# Patient Record
Sex: FEMALE | Race: OTHER | HISPANIC OR LATINO
[De-identification: names, ages, dates, MRNs, and addresses within clinical notes are randomized per-mention and may not be internally consistent; named-entity substitution may affect disease eponyms.]

---

## 2017-06-19 ENCOUNTER — APPOINTMENT (OUTPATIENT)
Dept: THORACIC SURGERY | Facility: CLINIC | Age: 52
End: 2017-06-19
Payer: COMMERCIAL

## 2017-06-19 VITALS
BODY MASS INDEX: 34.49 KG/M2 | HEART RATE: 76 BPM | DIASTOLIC BLOOD PRESSURE: 82 MMHG | RESPIRATION RATE: 14 BRPM | WEIGHT: 202 LBS | SYSTOLIC BLOOD PRESSURE: 120 MMHG | OXYGEN SATURATION: 99 % | HEIGHT: 64 IN

## 2017-06-19 DIAGNOSIS — Z82.49 FAMILY HISTORY OF ISCHEMIC HEART DISEASE AND OTHER DISEASES OF THE CIRCULATORY SYSTEM: ICD-10-CM

## 2017-06-19 DIAGNOSIS — Z82.3 FAMILY HISTORY OF STROKE: ICD-10-CM

## 2017-06-19 DIAGNOSIS — Z87.09 PERSONAL HISTORY OF OTHER DISEASES OF THE RESPIRATORY SYSTEM: ICD-10-CM

## 2017-06-19 DIAGNOSIS — Z86.018 PERSONAL HISTORY OF OTHER BENIGN NEOPLASM: ICD-10-CM

## 2017-06-19 DIAGNOSIS — Z77.128 CONTACT WITH AND (SUSPECTED) EXPOSURE TO OTHER HAZARDS IN THE PHYSICAL ENVIRONMENT: ICD-10-CM

## 2017-06-19 PROCEDURE — 99205 OFFICE O/P NEW HI 60 MIN: CPT

## 2017-06-19 RX ORDER — MONTELUKAST 10 MG/1
10 TABLET, FILM COATED ORAL
Qty: 30 | Refills: 0 | Status: COMPLETED | COMMUNITY
Start: 2016-09-01

## 2017-06-19 RX ORDER — PREDNISONE 20 MG/1
20 TABLET ORAL
Qty: 3 | Refills: 0 | Status: COMPLETED | COMMUNITY
Start: 2017-01-11

## 2017-06-19 RX ORDER — PREDNISONE 5 MG/1
5 TABLET ORAL
Qty: 42 | Refills: 0 | Status: COMPLETED | COMMUNITY
Start: 2017-03-17

## 2017-06-19 RX ORDER — AMOXICILLIN AND CLAVULANATE POTASSIUM 875; 125 MG/1; MG/1
875-125 TABLET, COATED ORAL
Qty: 14 | Refills: 0 | Status: COMPLETED | COMMUNITY
Start: 2017-05-31

## 2017-06-19 RX ORDER — FESOTERODINE FUMARATE 4 MG/1
4 TABLET, FILM COATED, EXTENDED RELEASE ORAL
Qty: 90 | Refills: 0 | Status: COMPLETED | COMMUNITY
Start: 2017-05-11

## 2017-06-19 RX ORDER — AZITHROMYCIN 250 MG/1
250 TABLET, FILM COATED ORAL
Qty: 6 | Refills: 0 | Status: COMPLETED | COMMUNITY
Start: 2017-01-06

## 2017-07-11 ENCOUNTER — RESULT CHARGE (OUTPATIENT)
Age: 52
End: 2017-07-11

## 2017-07-12 ENCOUNTER — APPOINTMENT (OUTPATIENT)
Dept: OTHER | Facility: CLINIC | Age: 52
End: 2017-07-12

## 2017-07-12 VITALS
BODY MASS INDEX: 34.83 KG/M2 | HEIGHT: 64 IN | SYSTOLIC BLOOD PRESSURE: 116 MMHG | OXYGEN SATURATION: 100 % | HEART RATE: 77 BPM | DIASTOLIC BLOOD PRESSURE: 78 MMHG | WEIGHT: 204 LBS

## 2017-07-12 RX ORDER — CHLORHEXIDINE GLUCONATE 4 %
LIQUID (ML) TOPICAL
Refills: 0 | Status: ACTIVE | COMMUNITY

## 2017-07-12 RX ORDER — MONTELUKAST 10 MG/1
10 TABLET, FILM COATED ORAL
Refills: 0 | Status: ACTIVE | COMMUNITY

## 2017-07-12 RX ORDER — LIDOCAINE 5% 700 MG/1
5 PATCH TOPICAL
Refills: 0 | Status: ACTIVE | COMMUNITY

## 2017-07-12 RX ORDER — ALBUTEROL 90 MCG
90 AEROSOL (GRAM) INHALATION
Refills: 0 | Status: ACTIVE | COMMUNITY

## 2017-07-13 LAB
ALBUMIN SERPL ELPH-MCNC: 4.1 G/DL
ALP BLD-CCNC: 129 U/L
ALT SERPL-CCNC: 41 U/L
ANION GAP SERPL CALC-SCNC: 12 MMOL/L
APPEARANCE: CLEAR
AST SERPL-CCNC: 27 U/L
BASOPHILS # BLD AUTO: 0.01 K/UL
BASOPHILS NFR BLD AUTO: 0.2 %
BILIRUB SERPL-MCNC: 0.3 MG/DL
BILIRUBIN URINE: NEGATIVE
BLOOD URINE: NEGATIVE
BUN SERPL-MCNC: 14 MG/DL
CALCIUM SERPL-MCNC: 9.8 MG/DL
CHLORIDE SERPL-SCNC: 107 MMOL/L
CHOLEST SERPL-MCNC: 240 MG/DL
CHOLEST/HDLC SERPL: 6.2 RATIO
CO2 SERPL-SCNC: 26 MMOL/L
COLOR: YELLOW
CREAT SERPL-MCNC: 0.83 MG/DL
EOSINOPHIL # BLD AUTO: 0.17 K/UL
EOSINOPHIL NFR BLD AUTO: 2.6 %
GLUCOSE QUALITATIVE U: NORMAL MG/DL
GLUCOSE SERPL-MCNC: 144 MG/DL
HCT VFR BLD CALC: 38.9 %
HDLC SERPL-MCNC: 39 MG/DL
HGB BLD-MCNC: 12.1 G/DL
IMM GRANULOCYTES NFR BLD AUTO: 0.5 %
KETONES URINE: NEGATIVE
LDLC SERPL CALC-MCNC: 135 MG/DL
LEUKOCYTE ESTERASE URINE: NEGATIVE
LYMPHOCYTES # BLD AUTO: 1.91 K/UL
LYMPHOCYTES NFR BLD AUTO: 29.2 %
MAN DIFF?: NORMAL
MCHC RBC-ENTMCNC: 27 PG
MCHC RBC-ENTMCNC: 31.1 GM/DL
MCV RBC AUTO: 86.8 FL
MONOCYTES # BLD AUTO: 0.28 K/UL
MONOCYTES NFR BLD AUTO: 4.3 %
NEUTROPHILS # BLD AUTO: 4.14 K/UL
NEUTROPHILS NFR BLD AUTO: 63.2 %
NITRITE URINE: NEGATIVE
PH URINE: 6
PLATELET # BLD AUTO: 287 K/UL
POTASSIUM SERPL-SCNC: 4.4 MMOL/L
PROT SERPL-MCNC: 7.1 G/DL
PROTEIN URINE: NEGATIVE MG/DL
RBC # BLD: 4.48 M/UL
RBC # FLD: 13.6 %
SODIUM SERPL-SCNC: 145 MMOL/L
SPECIFIC GRAVITY URINE: 1.02
TRIGL SERPL-MCNC: 328 MG/DL
UROBILINOGEN URINE: NORMAL MG/DL
WBC # FLD AUTO: 6.54 K/UL

## 2017-10-12 ENCOUNTER — OUTPATIENT (OUTPATIENT)
Dept: OUTPATIENT SERVICES | Facility: HOSPITAL | Age: 52
LOS: 1 days | End: 2017-10-12
Payer: COMMERCIAL

## 2017-10-12 ENCOUNTER — APPOINTMENT (OUTPATIENT)
Dept: SLEEP CENTER | Facility: CLINIC | Age: 52
End: 2017-10-12
Payer: COMMERCIAL

## 2017-10-12 PROCEDURE — 95810 POLYSOM 6/> YRS 4/> PARAM: CPT

## 2017-10-12 PROCEDURE — 95810 POLYSOM 6/> YRS 4/> PARAM: CPT | Mod: 26

## 2017-10-13 DIAGNOSIS — G47.33 OBSTRUCTIVE SLEEP APNEA (ADULT) (PEDIATRIC): ICD-10-CM

## 2017-10-26 ENCOUNTER — RESULT REVIEW (OUTPATIENT)
Age: 52
End: 2017-10-26

## 2018-03-12 ENCOUNTER — APPOINTMENT (OUTPATIENT)
Dept: OTHER | Facility: CLINIC | Age: 53
End: 2018-03-12
Payer: COMMERCIAL

## 2018-03-12 ENCOUNTER — APPOINTMENT (OUTPATIENT)
Dept: OTHER | Facility: CLINIC | Age: 53
End: 2018-03-12

## 2018-03-12 VITALS — RESPIRATION RATE: 18 BRPM | HEART RATE: 80 BPM | SYSTOLIC BLOOD PRESSURE: 120 MMHG | DIASTOLIC BLOOD PRESSURE: 68 MMHG

## 2018-03-12 PROCEDURE — 99214 OFFICE O/P EST MOD 30 MIN: CPT

## 2018-03-12 RX ORDER — ORLISTAT 120 MG/1
120 CAPSULE ORAL
Qty: 90 | Refills: 0 | Status: COMPLETED | COMMUNITY
Start: 2017-02-02 | End: 2018-03-12

## 2018-03-12 RX ORDER — KETOCONAZOLE 20.5 MG/ML
2 SHAMPOO, SUSPENSION TOPICAL
Qty: 120 | Refills: 0 | Status: COMPLETED | COMMUNITY
Start: 2016-12-16

## 2018-03-12 RX ORDER — OMEPRAZOLE 20 MG/1
20 CAPSULE, DELAYED RELEASE ORAL
Refills: 0 | Status: COMPLETED | COMMUNITY
End: 2018-03-12

## 2018-03-12 RX ORDER — METHYLPREDNISOLONE 4 MG/1
4 TABLET ORAL
Qty: 21 | Refills: 0 | Status: COMPLETED | COMMUNITY
Start: 2018-02-06

## 2018-03-12 RX ORDER — OSELTAMIVIR PHOSPHATE 75 MG/1
75 CAPSULE ORAL
Qty: 10 | Refills: 0 | Status: COMPLETED | COMMUNITY
Start: 2018-02-22

## 2018-03-12 RX ORDER — FLUTICASONE PROPIONATE 50 UG/1
50 SPRAY, METERED NASAL
Refills: 0 | Status: COMPLETED | COMMUNITY
End: 2018-03-12

## 2018-06-18 ENCOUNTER — APPOINTMENT (OUTPATIENT)
Dept: THORACIC SURGERY | Facility: CLINIC | Age: 53
End: 2018-06-18
Payer: COMMERCIAL

## 2018-06-18 VITALS
BODY MASS INDEX: 35.7 KG/M2 | HEART RATE: 84 BPM | OXYGEN SATURATION: 98 % | RESPIRATION RATE: 16 BRPM | DIASTOLIC BLOOD PRESSURE: 80 MMHG | WEIGHT: 208 LBS | SYSTOLIC BLOOD PRESSURE: 125 MMHG

## 2018-06-18 PROCEDURE — 99213 OFFICE O/P EST LOW 20 MIN: CPT

## 2018-06-19 ENCOUNTER — APPOINTMENT (OUTPATIENT)
Dept: THORACIC SURGERY | Facility: CLINIC | Age: 53
End: 2018-06-19
Payer: COMMERCIAL

## 2018-06-20 RX ORDER — FLUOCINOLONE ACETONIDE 0.1 MG/ML
0.01 SOLUTION TOPICAL
Qty: 60 | Refills: 0 | Status: COMPLETED | COMMUNITY
Start: 2018-05-11

## 2018-06-20 RX ORDER — CELECOXIB 200 MG/1
200 CAPSULE ORAL
Qty: 60 | Refills: 0 | Status: COMPLETED | COMMUNITY
Start: 2018-03-26

## 2018-06-20 RX ORDER — AMOXICILLIN 500 MG/1
500 CAPSULE ORAL
Qty: 20 | Refills: 0 | Status: COMPLETED | COMMUNITY
Start: 2018-04-02

## 2018-07-09 ENCOUNTER — APPOINTMENT (OUTPATIENT)
Dept: OTHER | Facility: CLINIC | Age: 53
End: 2018-07-09

## 2018-07-27 ENCOUNTER — APPOINTMENT (OUTPATIENT)
Dept: OTHER | Facility: CLINIC | Age: 53
End: 2018-07-27
Payer: COMMERCIAL

## 2018-07-27 VITALS
BODY MASS INDEX: 34.49 KG/M2 | OXYGEN SATURATION: 99 % | RESPIRATION RATE: 16 BRPM | SYSTOLIC BLOOD PRESSURE: 118 MMHG | WEIGHT: 202 LBS | HEIGHT: 64 IN | DIASTOLIC BLOOD PRESSURE: 81 MMHG | HEART RATE: 66 BPM

## 2018-07-27 PROCEDURE — 96150: CPT

## 2018-07-27 PROCEDURE — 94010 BREATHING CAPACITY TEST: CPT

## 2018-07-27 PROCEDURE — 99214 OFFICE O/P EST MOD 30 MIN: CPT | Mod: 25

## 2018-07-27 PROCEDURE — 99396 PREV VISIT EST AGE 40-64: CPT

## 2018-07-30 LAB
ALBUMIN SERPL ELPH-MCNC: 4.5 G/DL
ALP BLD-CCNC: 104 U/L
ALT SERPL-CCNC: 35 U/L
ANION GAP SERPL CALC-SCNC: 14 MMOL/L
APPEARANCE: CLEAR
AST SERPL-CCNC: 24 U/L
BASOPHILS # BLD AUTO: 0.01 K/UL
BASOPHILS NFR BLD AUTO: 0.2 %
BILIRUB SERPL-MCNC: 0.5 MG/DL
BILIRUBIN URINE: NEGATIVE
BLOOD URINE: NEGATIVE
BUN SERPL-MCNC: 11 MG/DL
CALCIUM SERPL-MCNC: 9.7 MG/DL
CHLORIDE SERPL-SCNC: 102 MMOL/L
CHOLEST SERPL-MCNC: 217 MG/DL
CHOLEST/HDLC SERPL: 5.3 RATIO
CO2 SERPL-SCNC: 26 MMOL/L
COLOR: YELLOW
CREAT SERPL-MCNC: 0.74 MG/DL
EOSINOPHIL # BLD AUTO: 0.29 K/UL
EOSINOPHIL NFR BLD AUTO: 4.9 %
GLUCOSE QUALITATIVE U: NEGATIVE MG/DL
GLUCOSE SERPL-MCNC: 99 MG/DL
HCT VFR BLD CALC: 41.6 %
HDLC SERPL-MCNC: 41 MG/DL
HGB BLD-MCNC: 13.2 G/DL
IMM GRANULOCYTES NFR BLD AUTO: 0.3 %
KETONES URINE: NEGATIVE
LDLC SERPL CALC-MCNC: 129 MG/DL
LEUKOCYTE ESTERASE URINE: NEGATIVE
LYMPHOCYTES # BLD AUTO: 1.6 K/UL
LYMPHOCYTES NFR BLD AUTO: 27.2 %
MAN DIFF?: NORMAL
MCHC RBC-ENTMCNC: 27.6 PG
MCHC RBC-ENTMCNC: 31.7 GM/DL
MCV RBC AUTO: 86.8 FL
MONOCYTES # BLD AUTO: 0.39 K/UL
MONOCYTES NFR BLD AUTO: 6.6 %
NEUTROPHILS # BLD AUTO: 3.58 K/UL
NEUTROPHILS NFR BLD AUTO: 60.8 %
NITRITE URINE: NEGATIVE
PH URINE: 6.5
PLATELET # BLD AUTO: 257 K/UL
POTASSIUM SERPL-SCNC: 4.4 MMOL/L
PROT SERPL-MCNC: 7.5 G/DL
PROTEIN URINE: NEGATIVE MG/DL
RBC # BLD: 4.79 M/UL
RBC # FLD: 13.9 %
SODIUM SERPL-SCNC: 142 MMOL/L
SPECIFIC GRAVITY URINE: 1.02
TRIGL SERPL-MCNC: 236 MG/DL
UROBILINOGEN URINE: NEGATIVE MG/DL
WBC # FLD AUTO: 5.89 K/UL

## 2018-11-16 ENCOUNTER — APPOINTMENT (OUTPATIENT)
Dept: HEPATOLOGY | Facility: CLINIC | Age: 53
End: 2018-11-16
Payer: COMMERCIAL

## 2018-11-16 VITALS
SYSTOLIC BLOOD PRESSURE: 127 MMHG | DIASTOLIC BLOOD PRESSURE: 80 MMHG | HEART RATE: 83 BPM | TEMPERATURE: 97.8 F | WEIGHT: 210 LBS | HEIGHT: 64 IN | BODY MASS INDEX: 35.85 KG/M2 | RESPIRATION RATE: 17 BRPM

## 2018-11-16 PROCEDURE — 99243 OFF/OP CNSLTJ NEW/EST LOW 30: CPT

## 2018-11-19 LAB
ALBUMIN SERPL ELPH-MCNC: 4.2 G/DL
ALP BLD-CCNC: 104 U/L
ALT SERPL-CCNC: 44 U/L
ANION GAP SERPL CALC-SCNC: 10 MMOL/L
AST SERPL-CCNC: 27 U/L
BASOPHILS # BLD AUTO: 0.01 K/UL
BASOPHILS NFR BLD AUTO: 0.1 %
BILIRUB SERPL-MCNC: 0.2 MG/DL
BUN SERPL-MCNC: 15 MG/DL
CALCIUM SERPL-MCNC: 9.7 MG/DL
CHLORIDE SERPL-SCNC: 106 MMOL/L
CO2 SERPL-SCNC: 26 MMOL/L
CREAT SERPL-MCNC: 0.96 MG/DL
EOSINOPHIL # BLD AUTO: 0.18 K/UL
EOSINOPHIL NFR BLD AUTO: 2.4 %
FERRITIN SERPL-MCNC: 49 NG/ML
GLUCOSE SERPL-MCNC: 85 MG/DL
HCT VFR BLD CALC: 36.5 %
HGB BLD-MCNC: 11.8 G/DL
IMM GRANULOCYTES NFR BLD AUTO: 0.4 %
LYMPHOCYTES # BLD AUTO: 2.47 K/UL
LYMPHOCYTES NFR BLD AUTO: 32.4 %
MAN DIFF?: NORMAL
MCHC RBC-ENTMCNC: 27.9 PG
MCHC RBC-ENTMCNC: 32.3 GM/DL
MCV RBC AUTO: 86.3 FL
MONOCYTES # BLD AUTO: 0.41 K/UL
MONOCYTES NFR BLD AUTO: 5.4 %
NEUTROPHILS # BLD AUTO: 4.53 K/UL
NEUTROPHILS NFR BLD AUTO: 59.3 %
PLATELET # BLD AUTO: 273 K/UL
POTASSIUM SERPL-SCNC: 4.4 MMOL/L
PROT SERPL-MCNC: 7.4 G/DL
RBC # BLD: 4.23 M/UL
RBC # FLD: 13.9 %
SODIUM SERPL-SCNC: 142 MMOL/L
WBC # FLD AUTO: 7.63 K/UL

## 2018-11-30 ENCOUNTER — APPOINTMENT (OUTPATIENT)
Dept: HEPATOLOGY | Facility: CLINIC | Age: 53
End: 2018-11-30
Payer: COMMERCIAL

## 2018-11-30 PROCEDURE — 91200 LIVER ELASTOGRAPHY: CPT

## 2018-12-11 ENCOUNTER — TRANSCRIPTION ENCOUNTER (OUTPATIENT)
Age: 53
End: 2018-12-11

## 2019-02-19 ENCOUNTER — APPOINTMENT (OUTPATIENT)
Dept: HEPATOLOGY | Facility: CLINIC | Age: 54
End: 2019-02-19

## 2019-02-22 ENCOUNTER — TRANSCRIPTION ENCOUNTER (OUTPATIENT)
Age: 54
End: 2019-02-22

## 2019-03-27 ENCOUNTER — TRANSCRIPTION ENCOUNTER (OUTPATIENT)
Age: 54
End: 2019-03-27

## 2019-03-29 ENCOUNTER — APPOINTMENT (OUTPATIENT)
Dept: HEPATOLOGY | Facility: CLINIC | Age: 54
End: 2019-03-29

## 2019-04-11 ENCOUNTER — APPOINTMENT (OUTPATIENT)
Dept: HEPATOLOGY | Facility: CLINIC | Age: 54
End: 2019-04-11
Payer: COMMERCIAL

## 2019-04-11 VITALS
RESPIRATION RATE: 17 BRPM | TEMPERATURE: 97.7 F | HEART RATE: 86 BPM | BODY MASS INDEX: 34.31 KG/M2 | HEIGHT: 64 IN | SYSTOLIC BLOOD PRESSURE: 136 MMHG | WEIGHT: 201 LBS | DIASTOLIC BLOOD PRESSURE: 89 MMHG

## 2019-04-11 PROCEDURE — 99214 OFFICE O/P EST MOD 30 MIN: CPT

## 2019-04-12 LAB
ALBUMIN SERPL ELPH-MCNC: 4.3 G/DL
ALP BLD-CCNC: 106 U/L
ALT SERPL-CCNC: 43 U/L
ANION GAP SERPL CALC-SCNC: 13 MMOL/L
AST SERPL-CCNC: 27 U/L
BASOPHILS # BLD AUTO: 0.02 K/UL
BASOPHILS NFR BLD AUTO: 0.3 %
BILIRUB SERPL-MCNC: 0.3 MG/DL
BUN SERPL-MCNC: 13 MG/DL
CALCIUM SERPL-MCNC: 9.9 MG/DL
CHLORIDE SERPL-SCNC: 105 MMOL/L
CO2 SERPL-SCNC: 25 MMOL/L
CREAT SERPL-MCNC: 0.88 MG/DL
EOSINOPHIL # BLD AUTO: 0.14 K/UL
EOSINOPHIL NFR BLD AUTO: 1.8 %
GLUCOSE SERPL-MCNC: 85 MG/DL
HCT VFR BLD CALC: 41.5 %
HGB BLD-MCNC: 12.7 G/DL
IMM GRANULOCYTES NFR BLD AUTO: 0.8 %
LYMPHOCYTES # BLD AUTO: 2.51 K/UL
LYMPHOCYTES NFR BLD AUTO: 32 %
MAN DIFF?: NORMAL
MCHC RBC-ENTMCNC: 26.9 PG
MCHC RBC-ENTMCNC: 30.6 GM/DL
MCV RBC AUTO: 87.9 FL
MONOCYTES # BLD AUTO: 0.46 K/UL
MONOCYTES NFR BLD AUTO: 5.9 %
NEUTROPHILS # BLD AUTO: 4.65 K/UL
NEUTROPHILS NFR BLD AUTO: 59.2 %
PLATELET # BLD AUTO: 277 K/UL
POTASSIUM SERPL-SCNC: 4 MMOL/L
PROT SERPL-MCNC: 7 G/DL
RBC # BLD: 4.72 M/UL
RBC # FLD: 13.7 %
SODIUM SERPL-SCNC: 143 MMOL/L
WBC # FLD AUTO: 7.84 K/UL

## 2019-04-21 ENCOUNTER — FORM ENCOUNTER (OUTPATIENT)
Age: 54
End: 2019-04-21

## 2019-04-22 ENCOUNTER — APPOINTMENT (OUTPATIENT)
Dept: ULTRASOUND IMAGING | Facility: IMAGING CENTER | Age: 54
End: 2019-04-22
Payer: COMMERCIAL

## 2019-04-22 ENCOUNTER — OUTPATIENT (OUTPATIENT)
Dept: OUTPATIENT SERVICES | Facility: HOSPITAL | Age: 54
LOS: 1 days | End: 2019-04-22
Payer: COMMERCIAL

## 2019-04-22 DIAGNOSIS — K76.0 FATTY (CHANGE OF) LIVER, NOT ELSEWHERE CLASSIFIED: ICD-10-CM

## 2019-04-22 PROCEDURE — 76700 US EXAM ABDOM COMPLETE: CPT

## 2019-04-22 PROCEDURE — 76700 US EXAM ABDOM COMPLETE: CPT | Mod: 26

## 2019-04-25 ENCOUNTER — TRANSCRIPTION ENCOUNTER (OUTPATIENT)
Age: 54
End: 2019-04-25

## 2019-05-30 PROBLEM — R91.1 LUNG NODULE: Status: ACTIVE | Noted: 2017-06-16

## 2019-05-31 ENCOUNTER — FORM ENCOUNTER (OUTPATIENT)
Age: 54
End: 2019-05-31

## 2019-06-01 ENCOUNTER — OUTPATIENT (OUTPATIENT)
Dept: OUTPATIENT SERVICES | Facility: HOSPITAL | Age: 54
LOS: 1 days | End: 2019-06-01
Payer: COMMERCIAL

## 2019-06-01 ENCOUNTER — APPOINTMENT (OUTPATIENT)
Dept: CT IMAGING | Facility: IMAGING CENTER | Age: 54
End: 2019-06-01
Payer: COMMERCIAL

## 2019-06-01 DIAGNOSIS — Z00.8 ENCOUNTER FOR OTHER GENERAL EXAMINATION: ICD-10-CM

## 2019-06-01 PROCEDURE — 71250 CT THORAX DX C-: CPT

## 2019-06-01 PROCEDURE — 71250 CT THORAX DX C-: CPT | Mod: 26

## 2019-06-04 ENCOUNTER — APPOINTMENT (OUTPATIENT)
Dept: THORACIC SURGERY | Facility: CLINIC | Age: 54
End: 2019-06-04
Payer: COMMERCIAL

## 2019-06-04 VITALS
SYSTOLIC BLOOD PRESSURE: 120 MMHG | RESPIRATION RATE: 16 BRPM | OXYGEN SATURATION: 97 % | HEART RATE: 88 BPM | BODY MASS INDEX: 35.36 KG/M2 | WEIGHT: 206 LBS | DIASTOLIC BLOOD PRESSURE: 79 MMHG

## 2019-06-04 DIAGNOSIS — R91.1 SOLITARY PULMONARY NODULE: ICD-10-CM

## 2019-06-04 PROCEDURE — 99213 OFFICE O/P EST LOW 20 MIN: CPT

## 2019-06-06 NOTE — PHYSICAL EXAM
[Auscultation Breath Sounds / Voice Sounds] : lungs were clear to auscultation bilaterally [Heart Rate And Rhythm] : heart rate was normal and rhythm regular [Heart Sounds] : normal S1 and S2 [Heart Sounds Gallop] : no gallops [Murmurs] : no murmurs [Chest Visual Inspection Thoracic Asymmetry] : no chest asymmetry [Heart Sounds Pericardial Friction Rub] : no pericardial rub [Examination Of The Chest] : the chest was normal in appearance [Diminished Respiratory Excursion] : normal chest expansion [Abdomen Soft] : soft [Bowel Sounds] : normal bowel sounds [Abdomen Mass (___ Cm)] : no abdominal mass palpated [Abdomen Tenderness] : non-tender [Nail Clubbing] : no clubbing  or cyanosis of the fingernails [Abnormal Walk] : normal gait [Musculoskeletal - Swelling] : no joint swelling seen [Skin Color & Pigmentation] : normal skin color and pigmentation [Skin Turgor] : normal skin turgor [Motor Tone] : muscle strength and tone were normal [Sensation] : the sensory exam was normal to light touch and pinprick [] : no rash [Deep Tendon Reflexes (DTR)] : deep tendon reflexes were 2+ and symmetric [Oriented To Time, Place, And Person] : oriented to person, place, and time [No Focal Deficits] : no focal deficits [Impaired Insight] : insight and judgment were intact [Affect] : the affect was normal

## 2019-06-06 NOTE — CONSULT LETTER
[Dear  ___] : Dear  [unfilled], [Consult Letter:] : I had the pleasure of evaluating your patient, [unfilled]. [( Thank you for referring [unfilled] for consultation for _____ )] : Thank you for referring [unfilled] for consultation for [unfilled] [Please see my note below.] : Please see my note below. [Consult Closing:] : Thank you very much for allowing me to participate in the care of this patient.  If you have any questions, please do not hesitate to contact me. [Sincerely,] : Sincerely, [DrSaeed  ___] : Dr. DIAZ [DrSaeed ___] : Dr. DIAZ [FreeTextEntry2] : Madie Combs MD (Hem/Onc)\kain Gaitan MD (PCP)\kain Long MD (Pul) [FreeTextEntry3] : Dane Lin MD, MPH \par System Director of Thoracic Surgery \par Director of Comprehensive Lung and Foregut [place] \par Professor Cardiovascular & Thoracic Surgery  \par [facility] [employer] [facility]\par

## 2019-06-06 NOTE — HISTORY OF PRESENT ILLNESS
[FreeTextEntry1] : 54 y/o F, never smoker, w/ hx of DM, HLD, lobular carcinoma in situ s/p Lt lumpectomy on 6/2013 (s/p tamoxifen x 6 mo, stop due to elevated LFT), who presents w/ increasing size lung nodules.\par \par CT Chest on 3/20/18:\par - few stable nonspecific subcentimeter nodules: 3mm TATO, another 3mm TATO, a 4mm RUL, a 5mm RML, and a 4mm RLL\par \par CT Chest on 6/1/19:\par - few 3mm josefina-fissural nodules, likely benign LNs, stable from 4/2017\par - JIAN\par \par Pt is here today for follow up. Admits to productive cough with large amount of yellowish sputum, congestion and chest pain while coughing.

## 2019-06-20 NOTE — DATA REVIEWED
[FreeTextEntry1] : CT Chest on 6/1/19:\par - few 3mm josefina-fissural nodules, likely benign LNs, stable from 4/2017\par - JIAN declined

## 2019-06-24 ENCOUNTER — EMERGENCY (EMERGENCY)
Facility: HOSPITAL | Age: 54
LOS: 1 days | Discharge: HOME | End: 2019-06-24
Attending: EMERGENCY MEDICINE
Payer: SELF-PAY

## 2019-06-24 VITALS
TEMPERATURE: 98 F | WEIGHT: 169.98 LBS | SYSTOLIC BLOOD PRESSURE: 120 MMHG | OXYGEN SATURATION: 97 % | HEIGHT: 62 IN | DIASTOLIC BLOOD PRESSURE: 81 MMHG | HEART RATE: 83 BPM | RESPIRATION RATE: 16 BRPM

## 2019-06-24 DIAGNOSIS — Z90.710 ACQUIRED ABSENCE OF BOTH CERVIX AND UTERUS: Chronic | ICD-10-CM

## 2019-06-24 PROCEDURE — 72192 CT PELVIS W/O DYE: CPT

## 2019-06-24 PROCEDURE — 70450 CT HEAD/BRAIN W/O DYE: CPT | Mod: 26

## 2019-06-24 PROCEDURE — 96372 THER/PROPH/DIAG INJ SC/IM: CPT

## 2019-06-24 PROCEDURE — 70450 CT HEAD/BRAIN W/O DYE: CPT

## 2019-06-24 PROCEDURE — 72125 CT NECK SPINE W/O DYE: CPT

## 2019-06-24 PROCEDURE — 72192 CT PELVIS W/O DYE: CPT | Mod: 26

## 2019-06-24 PROCEDURE — 72128 CT CHEST SPINE W/O DYE: CPT

## 2019-06-24 PROCEDURE — 72131 CT LUMBAR SPINE W/O DYE: CPT | Mod: 26

## 2019-06-24 PROCEDURE — 72131 CT LUMBAR SPINE W/O DYE: CPT

## 2019-06-24 PROCEDURE — 72128 CT CHEST SPINE W/O DYE: CPT | Mod: 26

## 2019-06-24 PROCEDURE — 72125 CT NECK SPINE W/O DYE: CPT | Mod: 26

## 2019-06-24 PROCEDURE — 99284 EMERGENCY DEPT VISIT MOD MDM: CPT | Mod: 25

## 2019-06-24 PROCEDURE — 99283 EMERGENCY DEPT VISIT LOW MDM: CPT

## 2019-06-24 RX ORDER — CYCLOBENZAPRINE HYDROCHLORIDE 10 MG/1
10 TABLET, FILM COATED ORAL ONCE
Refills: 0 | Status: COMPLETED | OUTPATIENT
Start: 2019-06-24 | End: 2019-06-24

## 2019-06-24 RX ORDER — IBUPROFEN 200 MG
1 TABLET ORAL
Qty: 30 | Refills: 0
Start: 2019-06-24

## 2019-06-24 RX ORDER — CYCLOBENZAPRINE HYDROCHLORIDE 10 MG/1
1 TABLET, FILM COATED ORAL
Qty: 15 | Refills: 0
Start: 2019-06-24

## 2019-06-24 RX ORDER — KETOROLAC TROMETHAMINE 30 MG/ML
30 SYRINGE (ML) INJECTION ONCE
Refills: 0 | Status: DISCONTINUED | OUTPATIENT
Start: 2019-06-24 | End: 2019-06-24

## 2019-06-24 RX ADMIN — Medication 30 MILLIGRAM(S): at 21:03

## 2019-06-24 RX ADMIN — Medication 30 MILLIGRAM(S): at 20:19

## 2019-06-24 RX ADMIN — CYCLOBENZAPRINE HYDROCHLORIDE 10 MILLIGRAM(S): 10 TABLET, FILM COATED ORAL at 20:19

## 2019-06-24 NOTE — ED PROVIDER NOTE - PROGRESS NOTE DETAILS
imaging negative. CTs unremarkable. Will dc with antiinflammatories and muscle relaxers. Patient in agreement with plan.
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
## 2019-06-24 NOTE — ED PROVIDER NOTE - MUSCULOSKELETAL, MLM
Spine appears normal, range of motion is not limited, midline cervical thoracic and lumbar tenderness, no hip tenderness,

## 2019-06-24 NOTE — ED PROVIDER NOTE - PMH
Anemia    Anxiety    Asthma    Borderline diabetic    Gastroenteritis    GERD (gastroesophageal reflux disease)    History of seasonal allergies    HLD (hyperlipidemia)    Human papillomavirus (HPV)    Lumbar spine pain  due to herniated disc  Uterine leiomyoma

## 2019-06-24 NOTE — ED PROVIDER NOTE - CONSTITUTIONAL, MLM
normal... Well appearing, nervous, well nourished, awake, alert, oriented to person, place, time/situation and in mild apparent distress.

## 2019-06-24 NOTE — ED PROVIDER NOTE - PSH
Breast cyst  Left breast cyst aspiration 10/2010  H/O hysterectomy for benign disease    History of hammer toe correction  Bilateral in 7/2011  History of lipoma removed from back  8/2011  History of prior ablation treatment  Uterine Ablation due to fibroids  S/P bunionectomy  Bilateral in 7/2011

## 2019-07-24 PROBLEM — J45.909 UNSPECIFIED ASTHMA, UNCOMPLICATED: Chronic | Status: ACTIVE | Noted: 2019-06-24

## 2019-07-24 PROBLEM — R73.03 PREDIABETES: Chronic | Status: ACTIVE | Noted: 2019-06-24

## 2019-07-24 PROBLEM — F41.9 ANXIETY DISORDER, UNSPECIFIED: Chronic | Status: ACTIVE | Noted: 2019-06-24

## 2019-07-24 PROBLEM — E78.5 HYPERLIPIDEMIA, UNSPECIFIED: Chronic | Status: ACTIVE | Noted: 2019-06-24

## 2019-07-30 ENCOUNTER — APPOINTMENT (OUTPATIENT)
Dept: OTHER | Facility: CLINIC | Age: 54
End: 2019-07-30
Payer: COMMERCIAL

## 2019-07-30 VITALS
SYSTOLIC BLOOD PRESSURE: 121 MMHG | HEIGHT: 64 IN | HEART RATE: 91 BPM | OXYGEN SATURATION: 98 % | RESPIRATION RATE: 16 BRPM | DIASTOLIC BLOOD PRESSURE: 72 MMHG | BODY MASS INDEX: 35.85 KG/M2 | WEIGHT: 210 LBS

## 2019-07-30 PROCEDURE — 99396 PREV VISIT EST AGE 40-64: CPT | Mod: 25

## 2019-07-30 PROCEDURE — 94010 BREATHING CAPACITY TEST: CPT

## 2019-07-30 PROCEDURE — 99214 OFFICE O/P EST MOD 30 MIN: CPT | Mod: 25

## 2019-07-30 RX ORDER — MOMETASONE FUROATE MONOHYDRATE 50 UG/1
SPRAY, METERED NASAL
Refills: 0 | Status: COMPLETED | COMMUNITY
End: 2019-07-30

## 2019-07-30 RX ORDER — METHOCARBAMOL 500 MG/1
500 TABLET, FILM COATED ORAL
Qty: 6 | Refills: 0 | Status: COMPLETED | COMMUNITY
Start: 2018-07-26 | End: 2019-07-30

## 2019-07-30 RX ORDER — CYANOCOBALAMIN (VITAMIN B-12) 1000MCG/ML
VIAL (ML) INJECTION
Refills: 0 | Status: ACTIVE | COMMUNITY

## 2019-07-31 LAB
ALBUMIN SERPL ELPH-MCNC: 3.8 G/DL
ALP BLD-CCNC: 113 U/L
ALT SERPL-CCNC: 37 U/L
ANION GAP SERPL CALC-SCNC: 12 MMOL/L
APPEARANCE: CLEAR
AST SERPL-CCNC: 25 U/L
BACTERIA: NEGATIVE
BASOPHILS # BLD AUTO: 0.03 K/UL
BASOPHILS NFR BLD AUTO: 0.4 %
BILIRUB SERPL-MCNC: 0.4 MG/DL
BILIRUBIN URINE: NEGATIVE
BLOOD URINE: NEGATIVE
BUN SERPL-MCNC: 11 MG/DL
CALCIUM SERPL-MCNC: 9.2 MG/DL
CHLORIDE SERPL-SCNC: 102 MMOL/L
CHOLEST SERPL-MCNC: 193 MG/DL
CHOLEST/HDLC SERPL: 5.1 RATIO
CO2 SERPL-SCNC: 25 MMOL/L
COLOR: YELLOW
CREAT SERPL-MCNC: 0.93 MG/DL
EOSINOPHIL # BLD AUTO: 0.19 K/UL
EOSINOPHIL NFR BLD AUTO: 2.5 %
GLUCOSE QUALITATIVE U: NEGATIVE
GLUCOSE SERPL-MCNC: 93 MG/DL
HCT VFR BLD CALC: 39.3 %
HDLC SERPL-MCNC: 38 MG/DL
HGB BLD-MCNC: 11.7 G/DL
HYALINE CASTS: 1 /LPF
IMM GRANULOCYTES NFR BLD AUTO: 1.5 %
KETONES URINE: NEGATIVE
LDLC SERPL CALC-MCNC: 107 MG/DL
LEUKOCYTE ESTERASE URINE: NEGATIVE
LYMPHOCYTES # BLD AUTO: 1.61 K/UL
LYMPHOCYTES NFR BLD AUTO: 21.6 %
MAN DIFF?: NORMAL
MCHC RBC-ENTMCNC: 27 PG
MCHC RBC-ENTMCNC: 29.8 GM/DL
MCV RBC AUTO: 90.8 FL
MICROSCOPIC-UA: NORMAL
MONOCYTES # BLD AUTO: 0.5 K/UL
MONOCYTES NFR BLD AUTO: 6.7 %
NEUTROPHILS # BLD AUTO: 5.02 K/UL
NEUTROPHILS NFR BLD AUTO: 67.3 %
NITRITE URINE: NEGATIVE
PH URINE: 6.5
PLATELET # BLD AUTO: 286 K/UL
POTASSIUM SERPL-SCNC: 4.5 MMOL/L
PROT SERPL-MCNC: 6.2 G/DL
PROTEIN URINE: NORMAL
RBC # BLD: 4.33 M/UL
RBC # FLD: 14 %
RED BLOOD CELLS URINE: 4 /HPF
SODIUM SERPL-SCNC: 139 MMOL/L
SPECIFIC GRAVITY URINE: 1.02
SQUAMOUS EPITHELIAL CELLS: 4 /HPF
TRIGL SERPL-MCNC: 240 MG/DL
UROBILINOGEN URINE: NORMAL
WBC # FLD AUTO: 7.46 K/UL
WHITE BLOOD CELLS URINE: 0 /HPF

## 2019-08-03 ENCOUNTER — EMERGENCY (EMERGENCY)
Facility: HOSPITAL | Age: 54
LOS: 1 days | Discharge: HOME | End: 2019-08-03
Attending: EMERGENCY MEDICINE
Payer: COMMERCIAL

## 2019-08-03 VITALS
TEMPERATURE: 98 F | DIASTOLIC BLOOD PRESSURE: 80 MMHG | HEART RATE: 76 BPM | RESPIRATION RATE: 17 BRPM | HEIGHT: 64 IN | OXYGEN SATURATION: 98 % | WEIGHT: 208.34 LBS | SYSTOLIC BLOOD PRESSURE: 127 MMHG

## 2019-08-03 DIAGNOSIS — Z90.710 ACQUIRED ABSENCE OF BOTH CERVIX AND UTERUS: Chronic | ICD-10-CM

## 2019-08-03 PROCEDURE — 71046 X-RAY EXAM CHEST 2 VIEWS: CPT | Mod: 26

## 2019-08-03 PROCEDURE — 99283 EMERGENCY DEPT VISIT LOW MDM: CPT | Mod: 25

## 2019-08-03 PROCEDURE — 71046 X-RAY EXAM CHEST 2 VIEWS: CPT

## 2019-08-03 PROCEDURE — 99283 EMERGENCY DEPT VISIT LOW MDM: CPT

## 2019-08-03 RX ORDER — CLARITHROMYCIN 500 MG
1 TABLET ORAL
Qty: 6 | Refills: 0
Start: 2019-08-03 | End: 2019-08-07

## 2019-08-03 NOTE — ED PROVIDER NOTE - OBJECTIVE STATEMENT
54 y/o F with an extensive PMHx/PSHx which includes Hyperlipidemia, Asthma  presents to ED c/o cough, chest congestion x 2 weeks. Denies chest pain, nausea, vomiting. Pt endorses sometimes experiencing coughing fits with associated SOB. Also endorses pain to left ear x yesterday. NKDA.

## 2019-08-03 NOTE — ED ADULT NURSE NOTE - NSSUSCREENINGQ1_ED_ALL_ED
Adult Progress Note      Chief Complaint   Patient presents with   • Follow-up     cholesterol, thyroid labs        HPI  The patient is here for 3-month recheck.  She generally prefers avoiding the doctor's office but her son asked her to be evaluated in February.  She was not taking her thyroid medicine correctly because she was forgetting that much of the time.  I increased it to 50 alternating with 75 after a TSH of 32.  She takes all of her pills together at night and that makes it a lot easier for her and she is more compliant.  Generally she seems in good spirits.  She and Hector seem to be doing well together.  They have a lot of help in the home.  They recently concluded some home health interventions for gene and and out of the house during that time.  They have a lady that helps him with her ADLs with a speak very highly of.  Hector's health is better and so that improves now that of course.  Today's LDL 41 and TSH 2.87 so right where it should be  Patient Active Problem List    Diagnosis Date Noted   • Essential hypertension 10/07/2016     Priority: Low   • History of pneumonia 06/12/2015     Priority: Low     2015     • Caregiver burden 10/10/2014     Priority: Low      Gene w CVA damage     • Anxiety associated with depression 04/29/2013     Priority: Low     Poor response anti depressants-- does ok w benzos     • Chronic ischemic heart disease 12/03/2011     Priority: Low     4 vessel CABG 2005 - neg Lexiscan 2011     • Hyperlipidemia 12/03/2011     Priority: Low   • Hypothyroidism 12/03/2011     Priority: Low     Severe when noncompliant with meds         Past Surgical History:   Procedure Laterality Date   • Coronary artery bypass graft  05/01/2005    4 vessel     ALLERGIES:  Mirtazapine   reports that she has never smoked. She has never used smokeless tobacco. She reports that she does not drink alcohol.  Vitals:    05/28/19 1522   BP: 160/56   Pulse: 65   Temp: 98.6 °F (37 °C)   SpO2: 96%    Weight: 49 kg (108 lb)   Height: 5' 1\" (1.549 m)     No visits with results within 1 Month(s) from this visit.   Latest known visit with results is:   Orders Only on 02/18/2019   Component Date Value   • THYROID STIMULATING HORM* 02/18/2019 32.000*     Current Outpatient Medications   Medication Sig Dispense Refill   • levothyroxine (SYNTHROID, LEVOTHROID) 75 MCG tablet Take 1 tablet by mouth 2 days a week. for thyroid   50 mcg the other 5 days 30 tablet 3   • metoPROLOL succinate (TOPROL-XL) 25 MG 24 hr tablet Take 0.5 tablets by mouth daily. FOR BLOOD PRESSURE AND HEART 45 tablet 3   • rosuvastatin (CRESTOR) 20 MG tablet Take 1 tablet by mouth nightly. In evening FOR CHOLESTEROL 90 tablet 3   • losartan (COZAAR) 25 MG tablet Take 1 tablet by mouth daily. for heart and bp 90 tablet 3   • ALPRAZolam (XANAX) 0.5 MG tablet TAKE 1 OR 1.5 TABLETS BY MOUTH AT BEDTIME AS NEEDED FOR RELAXANT 45 tablet 5     No current facility-administered medications for this visit.        Review of Systems   Constitutional: Negative for weight loss.   Respiratory: Negative for shortness of breath.         Not much work -- DEJESUS w 2 hours work   Cardiovascular: Negative for chest pain.   Psychiatric/Behavioral: Negative for depression. The patient is not nervous/anxious (likes taking care of Gene).        Physical Exam   Constitutional: She is well-developed, well-nourished, and in no distress.   Vivacious for age---  Just gave speech at [employer] re depression   Neck: No JVD present.   Cardiovascular: Normal rate and regular rhythm.   2/6 CAESAR,, PMI MCL   Pulmonary/Chest: She has no rales.   Abdominal:   Pseudomass right upper quadrant I think probably related to calcified aorta   Lymphadenopathy:     She has no cervical adenopathy.   Skin: Rash noted.   Psychiatric: Mood and affect normal.   Nursing note and vitals reviewed.          Assessment :  Problem List Items Addressed This Visit     Chronic ischemic heart disease - Primary     Anxiety associated with depression    Hyperlipidemia    Essential hypertension    Hypothyroidism            Plan:  Patient Instructions   1.  The patient is doing very well  2.  The thyroid levels and the cholesterol levels were excellent no change in medication suggested  3.  See me for an office call when Hector is next year in early August-Charla does not need blood test prior to that visit          Time spent with patient:   25 minGreater than 50 percent of the visit was spent in counseling and coordination of care.   Teddy Manning MD                             No

## 2019-10-18 ENCOUNTER — APPOINTMENT (OUTPATIENT)
Dept: HEPATOLOGY | Facility: CLINIC | Age: 54
End: 2019-10-18
Payer: COMMERCIAL

## 2019-10-18 VITALS
WEIGHT: 200 LBS | RESPIRATION RATE: 16 BRPM | DIASTOLIC BLOOD PRESSURE: 77 MMHG | SYSTOLIC BLOOD PRESSURE: 119 MMHG | HEART RATE: 86 BPM | BODY MASS INDEX: 34.15 KG/M2 | TEMPERATURE: 98.5 F | HEIGHT: 64 IN

## 2019-10-18 PROCEDURE — 99213 OFFICE O/P EST LOW 20 MIN: CPT

## 2019-10-18 RX ORDER — OXYCODONE 5 MG/1
5 TABLET ORAL
Qty: 15 | Refills: 0 | Status: DISCONTINUED | COMMUNITY
Start: 2018-10-16 | End: 2019-10-18

## 2019-10-18 NOTE — CONSULT LETTER
[Courtesy Letter:] : I had the pleasure of seeing your patient, [unfilled], in my office today. [Dear  ___] : Dear  [unfilled], [( Thank you for referring [unfilled] for consultation for _____ )] : Thank you for referring [unfilled] for consultation for [unfilled] [Sincerely,] : Sincerely, [Please see my note below.] : Please see my note below. [Consult Closing:] : Thank you very much for allowing me to participate in the care of this patient.  If you have any questions, please do not hesitate to contact me. [FreeTextEntry2] : Alla Gaitan MD [FreeTextEntry3] : Candice Gallardo\par Nurse Practitioner \par Hepatology\par [facility] \par [address]\[place] [address]\par Tel: [phone]\par \par \par

## 2019-10-18 NOTE — ASSESSMENT
[FreeTextEntry1] : Assessment and Plan:  is a 53 year-old female with history of IBS, GERD, SHILPA, hyperparathyroidism, multiple lung nodules, carpal tunnel syndrome, obstructive sleep apnea, breast cancer s/p lumpectomy, cervical dysplasia s/p total hysterectomy in 2012,  borderline DM and dyslipidemia, and non-alcoholic fatty liver disease presents to the hepatology clinic for follow up.\par \par 1.NAFLD \par She was diagnosed with fatty liver on abdominal US in 12/2013 and developed abnormal liver enzymes since 2014. She is s/p lumpectomy for CIS in 2013 s/p Tamoxifen from 8/2014 to 1/2015, and again 6/2015 to 9/2015 due to side effects and elevated liver enzymes respectively. A liver biopsy from 12/2015 reported moderate to severe steatosis, increased portal fibrosis, mild chronic inflammation in portal area. Her Fibroscan from 11/30/17 with a median liver stiffness of 5.8 kPa which is consistent with F0-F1 disease,  dB/m (S 3). Patient has NAFLD s/p liver biopsy and S3 steatosis on Fibroscan. She most likely has MetS associated NAFLD. Encouraged by patient’s recent weight loss. Advised patient to continue to diet and exercise and lose about 5-10% of her weight. We will repeat labs today. \par \par We discussed the natural history of non-alcoholic fatty liver disease, disease progression to JACQUES and cirrhosis and the risk of hepatocellular carcinoma. \par \par 2. Health Maintenance \par Immunizations: Immune to HAV, Immune to HBV\par Colonoscopy: 2016 reportedly was normal\par \par Follow Up: 6 months \par \par Candice Gallardo\par Nurse Practitioner \par Hepatology\par [facility] \par [address]\[place] [address]\par Tel: [phone]\par \par

## 2019-10-18 NOTE — PHYSICAL EXAM
[General Appearance - Alert] : alert [] : no respiratory distress [Heart Rate And Rhythm] : heart rate was normal and rhythm regular [Abnormal Walk] : normal gait [Bowel Sounds] : normal bowel sounds [Skin Color & Pigmentation] : normal skin color and pigmentation [Oriented To Time, Place, And Person] : oriented to person, place, and time [Spider Angioma] : No spider angioma(s) were observed [Scleral Icterus] : No Scleral Icterus [Ascites Fluid Wave] : no ascites fluid wave [Asterixis] : no asterixis observed [Abdominal  Ascites] : no ascites [Jaundice] : No jaundice [Depression] : no depression [Hallucinations] : ~T no ~M hallucinations [Delusions] : no ~T delusions [Suicidal Ideation] : no suicidal ideation [Homicidal Ideation] : no homicidal ideations [Preoccupiation With Violent Thoughts] : no preoccupation with violent thoughts [Abdomen Soft] : soft

## 2019-10-18 NOTE — HISTORY OF PRESENT ILLNESS
[de-identified] :  is a 53 year-old female with history of IBS, GERD, SHILPA, hyperparathyroidism, multiple lung nodules, carpal tunnel syndrome, obstructive sleep apnea, breast cancer s/p lumpectomy, cervical dysplasia s/p total hysterectomy in 2012,  borderline DM and dyslipidemia, and non-alcoholic fatty liver disease presents to the hepatology clinic for follow up.\par \par She was diagnosed with lobular carcinoma CIS in June 2013, and s/p lumpectomy in August 2013. She reportedly was started on Tamoxifen in August 2014 but held in 1/2015 due to possible side effects, Her symptoms subsided, and Tamoxifen was restarted in 6/2015 by [facility] oncologist which was held in 9/2015 due to elevated liver enzymes. Her abdominal US from 12/2013 reported hepatic steatosis, GB stones, no intrahepatic ductal dilatation. She developed abnormal liver enzymes since 2014. A CT of the abdomen on 7/17 /2015 reported fatty infiltration of the liver, no IHBD or focal mass. Her work up from 10/2015 notable for immunity to HAV and HBV, never exposed to HCV, IgG, A and M were normal, URBANO, SMA, LKM, SLA and tTGIgA were negative, ceruloplasmin was normal. A1AT 223, ferritin 186, with normal saturation, AFP normal. CBC normal, AST 31, ALT 47, , TB 0.4.  A liver biopsy from 12/2015 reported moderate to severe steatosis, increased portal fibrosis, mild chronic inflammation in portal area. EGD from 9/2016 reportedly was unremarkable, but chronic gastritis. s/p Esophageal motility in 4/2018  which reported was normal. Colonoscopy in 2016 reportedly was normal. Her Fibroscan from 11/30/17 with a median liver stiffness of 5.8 kPa which is consistent with F0-F1 disease,  dB/m (S 3). She reports her father had alcohol cirrhosis.\par \par Since her last visit, she reports she is doing well. She denies any recent hospitalization or illness. Her US of the abdomen from 04/22/19 showed hepatic steatosis. She has lost about 10 pounds in the past 3 months from diet and exercise (mainly walking). She reports she has been watching her portion control as well. She denies any alcohol use. She denies any fever, chills, anorexia, weight change, abdominal pain, jaundice, pruritus or fatigue.\par

## 2019-10-21 LAB
ALBUMIN SERPL ELPH-MCNC: 4.2 G/DL
ALP BLD-CCNC: 132 U/L
ALT SERPL-CCNC: 50 U/L
AST SERPL-CCNC: 28 U/L
BILIRUB DIRECT SERPL-MCNC: 0.1 MG/DL
BILIRUB INDIRECT SERPL-MCNC: 0.2 MG/DL
BILIRUB SERPL-MCNC: 0.3 MG/DL
PROT SERPL-MCNC: 6.8 G/DL

## 2020-04-24 ENCOUNTER — APPOINTMENT (OUTPATIENT)
Dept: HEPATOLOGY | Facility: CLINIC | Age: 55
End: 2020-04-24

## 2020-07-30 ENCOUNTER — APPOINTMENT (OUTPATIENT)
Dept: OTHER | Facility: CLINIC | Age: 55
End: 2020-07-30
Payer: COMMERCIAL

## 2020-07-30 PROCEDURE — 99396 PREV VISIT EST AGE 40-64: CPT | Mod: 95

## 2020-07-30 PROCEDURE — 99442: CPT | Mod: 95

## 2020-07-30 RX ORDER — GABAPENTIN 100 MG
100 TABLET ORAL
Refills: 0 | Status: COMPLETED | COMMUNITY
End: 2020-07-30

## 2020-07-30 RX ORDER — MAGNESIUM OXIDE/MAG AA CHELATE 300 MG
CAPSULE ORAL
Refills: 0 | Status: COMPLETED | COMMUNITY
End: 2020-07-30

## 2020-07-30 RX ORDER — FENOPROFEN CALCIUM 600 MG/1
600 TABLET, FILM COATED ORAL
Refills: 0 | Status: COMPLETED | COMMUNITY
Start: 2019-10-18 | End: 2020-07-30

## 2020-08-04 ENCOUNTER — FORM ENCOUNTER (OUTPATIENT)
Age: 55
End: 2020-08-04

## 2020-09-14 ENCOUNTER — RESULT REVIEW (OUTPATIENT)
Age: 55
End: 2020-09-14

## 2020-09-14 ENCOUNTER — APPOINTMENT (OUTPATIENT)
Dept: OBGYN | Facility: CLINIC | Age: 55
End: 2020-09-14
Payer: COMMERCIAL

## 2020-09-14 PROCEDURE — 99386 PREV VISIT NEW AGE 40-64: CPT

## 2020-09-16 ENCOUNTER — RESULT REVIEW (OUTPATIENT)
Age: 55
End: 2020-09-16

## 2020-09-16 ENCOUNTER — APPOINTMENT (OUTPATIENT)
Dept: ULTRASOUND IMAGING | Facility: IMAGING CENTER | Age: 55
End: 2020-09-16
Payer: COMMERCIAL

## 2020-09-16 ENCOUNTER — OUTPATIENT (OUTPATIENT)
Dept: OUTPATIENT SERVICES | Facility: HOSPITAL | Age: 55
LOS: 1 days | End: 2020-09-16
Payer: COMMERCIAL

## 2020-09-16 DIAGNOSIS — Z00.8 ENCOUNTER FOR OTHER GENERAL EXAMINATION: ICD-10-CM

## 2020-09-16 DIAGNOSIS — Z90.710 ACQUIRED ABSENCE OF BOTH CERVIX AND UTERUS: Chronic | ICD-10-CM

## 2020-09-16 PROCEDURE — 76856 US EXAM PELVIC COMPLETE: CPT

## 2020-09-16 PROCEDURE — 76830 TRANSVAGINAL US NON-OB: CPT | Mod: 26

## 2020-09-16 PROCEDURE — 76856 US EXAM PELVIC COMPLETE: CPT | Mod: 26,59

## 2020-09-16 PROCEDURE — 76830 TRANSVAGINAL US NON-OB: CPT

## 2020-10-02 ENCOUNTER — TRANSCRIPTION ENCOUNTER (OUTPATIENT)
Age: 55
End: 2020-10-02

## 2020-10-07 ENCOUNTER — TRANSCRIPTION ENCOUNTER (OUTPATIENT)
Age: 55
End: 2020-10-07

## 2020-10-08 ENCOUNTER — APPOINTMENT (OUTPATIENT)
Dept: OTHER | Facility: CLINIC | Age: 55
End: 2020-10-08
Payer: COMMERCIAL

## 2020-10-08 PROCEDURE — 99442: CPT | Mod: 95

## 2020-10-15 ENCOUNTER — TRANSCRIPTION ENCOUNTER (OUTPATIENT)
Age: 55
End: 2020-10-15

## 2020-10-20 ENCOUNTER — OUTPATIENT (OUTPATIENT)
Dept: OUTPATIENT SERVICES | Facility: HOSPITAL | Age: 55
LOS: 1 days | End: 2020-10-20
Payer: COMMERCIAL

## 2020-10-20 ENCOUNTER — APPOINTMENT (OUTPATIENT)
Dept: MAMMOGRAPHY | Facility: CLINIC | Age: 55
End: 2020-10-20
Payer: COMMERCIAL

## 2020-10-20 ENCOUNTER — RESULT REVIEW (OUTPATIENT)
Age: 55
End: 2020-10-20

## 2020-10-20 ENCOUNTER — APPOINTMENT (OUTPATIENT)
Dept: ULTRASOUND IMAGING | Facility: CLINIC | Age: 55
End: 2020-10-20
Payer: COMMERCIAL

## 2020-10-20 DIAGNOSIS — Z90.710 ACQUIRED ABSENCE OF BOTH CERVIX AND UTERUS: Chronic | ICD-10-CM

## 2020-10-20 DIAGNOSIS — Z00.8 ENCOUNTER FOR OTHER GENERAL EXAMINATION: ICD-10-CM

## 2020-10-20 LAB
ALBUMIN SERPL ELPH-MCNC: 4.3 G/DL
ALP BLD-CCNC: 136 U/L
ALT SERPL-CCNC: 53 U/L
AST SERPL-CCNC: 28 U/L
BILIRUB DIRECT SERPL-MCNC: 0.1 MG/DL
BILIRUB INDIRECT SERPL-MCNC: 0.2 MG/DL
BILIRUB SERPL-MCNC: 0.3 MG/DL
PROT SERPL-MCNC: 6.9 G/DL

## 2020-10-20 PROCEDURE — 77066 DX MAMMO INCL CAD BI: CPT | Mod: 26

## 2020-10-20 PROCEDURE — 76641 ULTRASOUND BREAST COMPLETE: CPT | Mod: 26,50

## 2020-10-20 PROCEDURE — G0279: CPT | Mod: 26

## 2020-10-20 PROCEDURE — 76641 ULTRASOUND BREAST COMPLETE: CPT

## 2020-10-20 PROCEDURE — G0279: CPT

## 2020-10-20 PROCEDURE — 77066 DX MAMMO INCL CAD BI: CPT

## 2020-10-26 ENCOUNTER — APPOINTMENT (OUTPATIENT)
Dept: HEPATOLOGY | Facility: CLINIC | Age: 55
End: 2020-10-26
Payer: COMMERCIAL

## 2020-10-26 PROCEDURE — 99072 ADDL SUPL MATRL&STAF TM PHE: CPT

## 2020-10-26 PROCEDURE — 99213 OFFICE O/P EST LOW 20 MIN: CPT | Mod: 25

## 2020-10-26 NOTE — PHYSICAL EXAM
[General Appearance - Alert] : alert [] : no respiratory distress [Heart Rate And Rhythm] : heart rate was normal and rhythm regular [Bowel Sounds] : normal bowel sounds [Abdomen Soft] : soft [Abnormal Walk] : normal gait [Skin Color & Pigmentation] : normal skin color and pigmentation [Oriented To Time, Place, And Person] : oriented to person, place, and time [Scleral Icterus] : No Scleral Icterus [Spider Angioma] : No spider angioma(s) were observed [Abdominal  Ascites] : no ascites [Ascites Fluid Wave] : no ascites fluid wave [Asterixis] : no asterixis observed [Jaundice] : No jaundice [Depression] : no depression [Hallucinations] : ~T no ~M hallucinations [Delusions] : no ~T delusions [Suicidal Ideation] : no suicidal ideation [Homicidal Ideation] : no homicidal ideations [Preoccupiation With Violent Thoughts] : no preoccupation with violent thoughts

## 2020-10-26 NOTE — ASSESSMENT
[FreeTextEntry1] : Assessment and Plan:  is a 54 year-old female with history of IBS, GERD, SHILPA, hyperparathyroidism, multiple lung nodules, carpal tunnel syndrome, obstructive sleep apnea, breast cancer s/p lumpectomy, cervical dysplasia s/p total hysterectomy in 2012,  borderline DM and dyslipidemia, and non-alcoholic fatty liver disease presents to the hepatology clinic for follow up.\par \par 1.NAFLD \par She was diagnosed with fatty liver on abdominal US in 12/2013 and developed abnormal liver enzymes since 2014. She is s/p lumpectomy for CIS in 2013 s/p Tamoxifen from 8/2014 to 1/2015, and again 6/2015 to 9/2015 due to side effects and elevated liver enzymes respectively. A liver biopsy from 12/2015 reported moderate to severe steatosis, increased portal fibrosis, mild chronic inflammation in portal area. Her Fibroscan from 11/30/17 with a median liver stiffness of 5.8 kPa which is consistent with F0-F1 disease,  dB/m (S 3). Patient has NAFLD s/p liver biopsy and S3 steatosis on Fibroscan. She most likely has MetS associated NAFLD. Encouraged by patient’s recent weight loss. Advised patient to continue to diet and exercise and lose about 5-10% of her weight. We discussed the natural history of non-alcoholic fatty liver disease, disease progression to JACQUES and cirrhosis and the risk of hepatocellular carcinoma.\par \par We will repeat an abdominal ultrasound now. She will discuss with her PCP regarding her epigastric pain. She is currently on famotidine for GERD and she reports this has been helping.  \par \par 2. Health Maintenance \par Immunizations: Immune to HAV, Immune to HBV\par Colonoscopy: 2016 reportedly was normal\par \par Follow Up: 3 months \par \par Candice Gallardo\par Nurse Practitioner \par Hepatology\par [facility] \par [address]\[place] [address]\par Tel: [phone]\par

## 2020-10-26 NOTE — REVIEW OF SYSTEMS
[Negative] : Heme/Lymph [Recent Weight Gain (___ Lbs)] : recent [unfilled] ~Ulb weight gain [Abdominal Pain] : abdominal pain [Heartburn] : heartburn [As Noted in HPI] : as noted in HPI [Arthralgias] : arthralgias [Joint Pain] : joint pain [Fever] : no fever [Chills] : no chills [Feeling Poorly] : not feeling poorly [Feeling Tired] : not feeling tired [Recent Weight Loss (___ Lbs)] : no recent weight loss [Vomiting] : no vomiting [Constipation] : no constipation [Diarrhea] : no diarrhea [Melena] : no melena
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
## 2020-11-03 ENCOUNTER — OUTPATIENT (OUTPATIENT)
Dept: OUTPATIENT SERVICES | Facility: HOSPITAL | Age: 55
LOS: 1 days | End: 2020-11-03
Payer: COMMERCIAL

## 2020-11-03 ENCOUNTER — APPOINTMENT (OUTPATIENT)
Dept: ULTRASOUND IMAGING | Facility: CLINIC | Age: 55
End: 2020-11-03
Payer: COMMERCIAL

## 2020-11-03 DIAGNOSIS — Z90.710 ACQUIRED ABSENCE OF BOTH CERVIX AND UTERUS: Chronic | ICD-10-CM

## 2020-11-03 DIAGNOSIS — Z00.8 ENCOUNTER FOR OTHER GENERAL EXAMINATION: ICD-10-CM

## 2020-11-03 PROCEDURE — 76700 US EXAM ABDOM COMPLETE: CPT | Mod: 26

## 2020-11-03 PROCEDURE — 76700 US EXAM ABDOM COMPLETE: CPT

## 2020-11-04 ENCOUNTER — NON-APPOINTMENT (OUTPATIENT)
Age: 55
End: 2020-11-04

## 2020-11-05 ENCOUNTER — APPOINTMENT (OUTPATIENT)
Dept: GYNECOLOGIC ONCOLOGY | Facility: CLINIC | Age: 55
End: 2020-11-05
Payer: COMMERCIAL

## 2020-11-05 DIAGNOSIS — Z87.42 PERSONAL HISTORY OF OTHER DISEASES OF THE FEMALE GENITAL TRACT: ICD-10-CM

## 2020-11-05 DIAGNOSIS — B97.7 PAPILLOMAVIRUS AS THE CAUSE OF DISEASES CLASSIFIED ELSEWHERE: ICD-10-CM

## 2020-11-05 DIAGNOSIS — N76.2 ACUTE VULVITIS: ICD-10-CM

## 2020-11-05 DIAGNOSIS — Z86.018 PERSONAL HISTORY OF OTHER BENIGN NEOPLASM: ICD-10-CM

## 2020-11-05 DIAGNOSIS — Z80.0 FAMILY HISTORY OF MALIGNANT NEOPLASM OF DIGESTIVE ORGANS: ICD-10-CM

## 2020-11-05 PROCEDURE — 99203 OFFICE O/P NEW LOW 30 MIN: CPT | Mod: 25

## 2020-11-05 PROCEDURE — 99072 ADDL SUPL MATRL&STAF TM PHE: CPT

## 2020-11-05 PROCEDURE — 57421 EXAM/BIOPSY OF VAG W/SCOPE: CPT

## 2020-11-05 NOTE — PAST MEDICAL HISTORY
[Postmenopausal] : The patient is postmenopausal [Menarche Age ____] : age at menarche was [unfilled] [Total Preg ___] : G[unfilled] [Live Births ___] : P[unfilled]  [Full Term ___] : Full Term: [unfilled] [Abortions ___] : Abortions:[unfilled] [Living ___] : Living: [unfilled]

## 2020-11-05 NOTE — OB HISTORY
[Total Preg ___] : : [unfilled] [Full Term ___] : [unfilled] (full-term) [Abortions ___] : [unfilled] (abortions) [Living ___] : [unfilled] (living) [Vaginal ___] : [unfilled] vaginal delivery(s) [Menarche Age ____] : age at menarche was [unfilled]

## 2020-11-16 LAB — CORE LAB BIOPSY: NORMAL

## 2020-11-17 NOTE — REVIEW OF SYSTEMS
[Negative] : Musculoskeletal [Diabetes] : diabetes mellitus [Dyspareunia] : dyspareunia [FreeTextEntry4] : hx of low grade urothelial carcinoma

## 2020-11-17 NOTE — HISTORY OF PRESENT ILLNESS
[FreeTextEntry1] : Ms. Danielle is a postmenopausal 54 year old  female, referred for a second opinion to discuss her abnormal vaginal pap smears. She had a long standing history of cervical dysplasia.  She underwent an LAVH for cervical dysplasia (small fragment HSIL on ECC) in  with Dr. Thomas.  At that time no residual cervical dysplasia was noted on final pathology.   Postoperatively she developed a pelvic abscess and sepsis requiring hospitalization.  Abscess was drained, and she was treated with IV antibiotics.    \par \par From that point [facility]  She notes annual normal pap smears with (+) HRHPV.  We have [facility] recent [facility].\par She went a second opinion with Dr. Owens.  A repeat pap smear was performed which was ASCUS.  No colposcopy was performed, per patient. Per patient [facility] didn’t identify the ovaries but no abnormality was seen.\par \par 2020- Pap- ASCUS ; (-)HRHPV\par \par PMH: NAFLD, IBS, multiple lung nodules, SHILPA, cervical dysplasia, DM, GERD, hyperparathyroidism, low grade urothelial carcinoma\par PSH: LSC Cholecystectomy, TVH, lumpectomy \par \par [place] [facility] [place].\par \par She has dyspareunia since surgery and hasn't been sexually active for years.  \par She denies vaginal bleeding and vaginal discharge. She denies any other associated signs or symptoms.  She is here today for colposcopy.\par \par HM:\par Pap- see above\par Mammo- 10/2020- BIRADS-2\par Colonoscopy- unsure, was normal per patient\par \par Self-referred for a second opinion\par GYN- Dr. Blackwell\par PCP- Dr. Perez\par GI- Dr. Lin\par Endocrinologist- Dr. Weaver\par Pulmonologist- Dr. Vela

## 2020-11-17 NOTE — PHYSICAL EXAM
[Absent] : Adnexa(ae): Absent [Normal] : Anus and perineum: Normal sphincter tone, no masses, no prolapse. [de-identified] : healed LSC scars [de-identified] : cuff mobile, nontender

## 2020-11-19 ENCOUNTER — APPOINTMENT (OUTPATIENT)
Dept: HEPATOLOGY | Facility: CLINIC | Age: 55
End: 2020-11-19
Payer: COMMERCIAL

## 2020-11-19 VITALS
OXYGEN SATURATION: 98 % | SYSTOLIC BLOOD PRESSURE: 136 MMHG | BODY MASS INDEX: 34.66 KG/M2 | TEMPERATURE: 98.7 F | HEIGHT: 64 IN | WEIGHT: 203 LBS | HEART RATE: 77 BPM | DIASTOLIC BLOOD PRESSURE: 81 MMHG | RESPIRATION RATE: 14 BRPM

## 2020-11-19 DIAGNOSIS — R07.81 PLEURODYNIA: ICD-10-CM

## 2020-11-19 DIAGNOSIS — E21.3 HYPERPARATHYROIDISM, UNSPECIFIED: ICD-10-CM

## 2020-11-19 DIAGNOSIS — E73.9 LACTOSE INTOLERANCE, UNSPECIFIED: ICD-10-CM

## 2020-11-19 PROCEDURE — 99215 OFFICE O/P EST HI 40 MIN: CPT

## 2020-11-19 NOTE — HISTORY OF PRESENT ILLNESS
[de-identified] : - 11/19/20: initial visit with me, returns after last seen by DEBO Gallardo a month ago and before by Dr. Lin.  Had labs an US. Reports resolution of luzma right flank pain and epigastric pain.  R flank pain occurs often  -. Reports epigastric protrusion and painful bloating after certain food, pain radiates to right back. Today mild, but can be severe. Reportedly, US tech felt hardened muscles r flank. Reports epigastric pain after eating meat, greasy food. Pain also triggered by milk. She has had pruritus on arms, upper chest for 2-3 years, had an unrevealing skin biopsy and gets cremes by her dermatologist. Does not involve palms and soles.\par \par Weight hx: 203 lbs, BMI 34.9 as of 11/2020 - longstanding obesity for most of her adult life. Gained weight after her last pregnancy around 1992. 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 \par - EGD from 9/2016 reportedly was unremarkable, but chronic gastritis\par - Esophageal motility in 4/2018  which reported was normal\par - Colonoscopy in 2016 reportedly was normal\par -  Fibroscan from 11/30/17 with a median liver stiffness of 5.8 kPa which is consistent with F0-F1 disease,  dB/m (S 3). She reports her father had alcohol cirrhosis\par - US of the abdomen from 04/22/19 showed hepatic steatosis.\par \par

## 2020-11-19 NOTE — PHYSICAL EXAM
[General Appearance - Alert] : alert [General Appearance - In No Acute Distress] : in no acute distress [Sclera] : the sclera and conjunctiva were normal [PERRL With Normal Accommodation] : pupils were equal in size, round, and reactive to light [Extraocular Movements] : extraocular movements were intact [Outer Ear] : the ears and nose were normal in appearance [Oropharynx] : the oropharynx was normal [Neck Appearance] : the appearance of the neck was normal [Neck Cervical Mass (___cm)] : no neck mass was observed [Jugular Venous Distention Increased] : there was no jugular-venous distention [Thyroid Diffuse Enlargement] : the thyroid was not enlarged [Thyroid Nodule] : there were no palpable thyroid nodules [Auscultation Breath Sounds / Voice Sounds] : lungs were clear to auscultation bilaterally [Heart Rate And Rhythm] : heart rate was normal and rhythm regular [Heart Sounds] : normal S1 and S2 [Heart Sounds Gallop] : no gallops [Murmurs] : no murmurs [Heart Sounds Pericardial Friction Rub] : no pericardial rub [Full Pulse] : the pedal pulses are present [Edema] : there was no peripheral edema [Bowel Sounds] : normal bowel sounds [Abdomen Soft] : soft [Abdomen Tenderness] : non-tender [Abdomen Mass (___ Cm)] : no abdominal mass palpated [External Female Genitalia] : normal external genitalia [Urethral Meatus] : normal urethra [Urinary Bladder Findings] : the bladder was normal on palpation [Cervical Lymph Nodes Enlarged Posterior Bilaterally] : posterior cervical [Cervical Lymph Nodes Enlarged Anterior Bilaterally] : anterior cervical [Supraclavicular Lymph Nodes Enlarged Bilaterally] : supraclavicular [Axillary Lymph Nodes Enlarged Bilaterally] : axillary [Femoral Lymph Nodes Enlarged Bilaterally] : femoral [Inguinal Lymph Nodes Enlarged Bilaterally] : inguinal [No CVA Tenderness] : no ~M costovertebral angle tenderness [No Spinal Tenderness] : no spinal tenderness [Skin Color & Pigmentation] : normal skin color and pigmentation [Skin Turgor] : normal skin turgor [] : no rash [Deep Tendon Reflexes (DTR)] : deep tendon reflexes were 2+ and symmetric [Sensation] : the sensory exam was normal to light touch and pinprick [No Focal Deficits] : no focal deficits [Oriented To Time, Place, And Person] : oriented to person, place, and time [Impaired Insight] : insight and judgment were intact [Affect] : the affect was normal [Scleral Icterus] : No Scleral Icterus [Abdominal  Ascites] : no ascites [Liver Palpable ___ Finger Breadths Below Costal Margin] : was not palpable below costal margin [Jaundice] : No jaundice [Palmar Erythema] : no Palmar Erythema [Depression] : no depression [FreeTextEntry1] : tender xyphoid, tender lower ribs right side anteriorly and posteriorly

## 2020-11-19 NOTE — REVIEW OF SYSTEMS
[Recent Weight Gain (___ Lbs)] : recent [unfilled] ~Ulb weight gain [Abdominal Pain] : abdominal pain [Heartburn] : heartburn [As Noted in HPI] : as noted in HPI [Arthralgias] : arthralgias [Joint Pain] : joint pain [Negative] : Heme/Lymph [Fever] : no fever [Chills] : no chills [Feeling Poorly] : not feeling poorly [Feeling Tired] : not feeling tired [Recent Weight Loss (___ Lbs)] : no recent weight loss [Vomiting] : no vomiting [Constipation] : no constipation [Diarrhea] : no diarrhea [Melena] : no melena

## 2020-11-19 NOTE — ASSESSMENT
[FreeTextEntry1] : Ms. MAYS is a 54 year old woman with hyperparathyroidism, carpal tunnel syndrome, breast cancer s/p lumpectomy, mult. lung nodules, cervical dysplasia s/p total hysterectomy in 2012. FHx: alcoholic cirrhosis in father and uncle.\par \par - JACQUES\par   - US 10/2020: fatty liver\par   - fibroscan 2018: stage 0-1 fibrosis, stage 3 steatosis. Pt. has never had a liver biopsy.\par   - intermittent AST/ALT elevation. Serologic workup negative for other cause.\par - obesity, BMI 34\par - mixed hyperlipidemia\par - glucose intolerance, on alogliptin\par \par - painful epigastric bloating after eating: likely IBS. Has lactose intolerance. Does eat white bread and legumes now and then, but not daily.\par - GERD, on famotidine\par - pruritus: not likely related to liver disease\par \par - Immune to HAV, Immune to HBV\par - Colonoscopy: 2016 reportedly normal[facility].\par \par Plan:\par - obesity: will order semaglutide 3 mg/d PO; Pt. will discuss adjustment of alugliptin dose with her endocrinologist if   approved\par - GERD, rib pain, JACQUES: weight loss - diet, exercise, semaglutide\par - pruritus: f/u with dermatology\par - IBS: low FODMAP diet - list from Up[employer] handed to patient and most likely triggers discussed.\par

## 2020-12-02 ENCOUNTER — APPOINTMENT (OUTPATIENT)
Dept: MAMMOGRAPHY | Facility: IMAGING CENTER | Age: 55
End: 2020-12-02

## 2020-12-02 ENCOUNTER — APPOINTMENT (OUTPATIENT)
Dept: ULTRASOUND IMAGING | Facility: IMAGING CENTER | Age: 55
End: 2020-12-02

## 2020-12-22 ENCOUNTER — APPOINTMENT (OUTPATIENT)
Dept: HEPATOLOGY | Facility: CLINIC | Age: 55
End: 2020-12-22
Payer: COMMERCIAL

## 2020-12-22 VITALS
BODY MASS INDEX: 34.15 KG/M2 | OXYGEN SATURATION: 99 % | HEIGHT: 64 IN | WEIGHT: 200 LBS | SYSTOLIC BLOOD PRESSURE: 137 MMHG | TEMPERATURE: 98.3 F | HEART RATE: 57 BPM | DIASTOLIC BLOOD PRESSURE: 86 MMHG | RESPIRATION RATE: 14 BRPM

## 2020-12-22 DIAGNOSIS — Z87.898 PERSONAL HISTORY OF OTHER SPECIFIED CONDITIONS: ICD-10-CM

## 2020-12-22 DIAGNOSIS — R10.13 EPIGASTRIC PAIN: ICD-10-CM

## 2020-12-22 DIAGNOSIS — R10.11 RIGHT UPPER QUADRANT PAIN: ICD-10-CM

## 2020-12-22 PROCEDURE — 99214 OFFICE O/P EST MOD 30 MIN: CPT

## 2020-12-22 PROCEDURE — 99072 ADDL SUPL MATRL&STAF TM PHE: CPT

## 2020-12-22 NOTE — HISTORY OF PRESENT ILLNESS
[de-identified] : - 12/22/20: started semaglutide 3 mg/d - initially had some nausea which resolved, still feels fullness. weight 3 lbs lighter - uncertain if she really started losing weight.  Epigastric dyscomfort has resolved since 2nd week after starting semaglutide. She stopped taking alogliptin after talking to her endocrinologist. Fibroscan not done - she was not told about it when she checked out last time.\par \par - 11/19/20: initial visit with me, returns after last seen by DEBO Gallardo a month ago and before by Dr. Lin.  Had labs an US. Reports resolution of right flank pain and epigastric pain. R flank pain occurs often  -. Reports epigastric protrusion and painful bloating after certain food, pain radiates to right back. Today mild, but can be severe. Reportedly, US tech felt hardened muscles r flank. Reports epigastric pain after eating meat, greasy food. Pain also triggered by milk. She has had pruritus on arms, upper chest for 2-3 years, had an unrevealing skin biopsy and gets cremes by her dermatologist. Does not involve palms and soles.\par \par Weight hx: 203 lbs, BMI 34.9 as of 11/2020 - longstanding obesity for most of her adult life. Gained weight after her last pregnancy around 1992. 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 Lin's note: moderate to severe steatosis, increased portal fibrosis, mild chronic inflammation in portal area. \par - EGD from 9/2016 reportedly was unremarkable, but chronic gastritis\par - Esophageal motility in 4/2018  which reported was normal\par - Colonoscopy in 2016 reportedly was normal\par -  Fibroscan from 11/30/17 with a median liver stiffness of 5.8 kPa which is consistent with F0-F1 disease,  dB/m (S 3). She reports her father had alcohol cirrhosis\par - US of the abdomen from 04/22/19 showed hepatic steatosis.\par \par

## 2021-01-25 ENCOUNTER — APPOINTMENT (OUTPATIENT)
Dept: HEPATOLOGY | Facility: CLINIC | Age: 56
End: 2021-01-25
Payer: COMMERCIAL

## 2021-01-25 LAB
ALBUMIN SERPL ELPH-MCNC: 4.1 G/DL
ALP BLD-CCNC: 146 U/L
ALT SERPL-CCNC: 45 U/L
ANION GAP SERPL CALC-SCNC: 10 MMOL/L
AST SERPL-CCNC: 24 U/L
BILIRUB SERPL-MCNC: 0.4 MG/DL
BUN SERPL-MCNC: 12 MG/DL
CALCIUM SERPL-MCNC: 9.5 MG/DL
CHLORIDE SERPL-SCNC: 105 MMOL/L
CHOLEST SERPL-MCNC: 226 MG/DL
CO2 SERPL-SCNC: 24 MMOL/L
CREAT SERPL-MCNC: 0.94 MG/DL
ESTIMATED AVERAGE GLUCOSE: 137 MG/DL
GLUCOSE SERPL-MCNC: 125 MG/DL
HBA1C MFR BLD HPLC: 6.4 %
HDLC SERPL-MCNC: 37 MG/DL
LDLC SERPL CALC-MCNC: 144 MG/DL
NONHDLC SERPL-MCNC: 189 MG/DL
POTASSIUM SERPL-SCNC: 4.6 MMOL/L
PROT SERPL-MCNC: 6.7 G/DL
SODIUM SERPL-SCNC: 139 MMOL/L
TRIGL SERPL-MCNC: 229 MG/DL

## 2021-01-25 PROCEDURE — 99072 ADDL SUPL MATRL&STAF TM PHE: CPT

## 2021-01-25 PROCEDURE — 91200 LIVER ELASTOGRAPHY: CPT

## 2021-02-10 ENCOUNTER — RX RENEWAL (OUTPATIENT)
Age: 56
End: 2021-02-10

## 2021-02-11 ENCOUNTER — APPOINTMENT (OUTPATIENT)
Dept: HEPATOLOGY | Facility: CLINIC | Age: 56
End: 2021-02-11
Payer: COMMERCIAL

## 2021-02-11 VITALS
TEMPERATURE: 97.9 F | RESPIRATION RATE: 14 BRPM | OXYGEN SATURATION: 98 % | WEIGHT: 203.38 LBS | HEART RATE: 74 BPM | DIASTOLIC BLOOD PRESSURE: 80 MMHG | BODY MASS INDEX: 34.72 KG/M2 | HEIGHT: 64 IN | SYSTOLIC BLOOD PRESSURE: 122 MMHG

## 2021-02-11 DIAGNOSIS — E78.00 PURE HYPERCHOLESTEROLEMIA, UNSPECIFIED: ICD-10-CM

## 2021-02-11 PROCEDURE — 99213 OFFICE O/P EST LOW 20 MIN: CPT

## 2021-02-11 PROCEDURE — 99072 ADDL SUPL MATRL&STAF TM PHE: CPT

## 2021-02-11 RX ORDER — ALOGLIPTIN 25 MG/1
25 TABLET, FILM COATED ORAL
Refills: 0 | Status: DISCONTINUED | COMMUNITY
End: 2021-02-11

## 2021-02-11 NOTE — ASSESSMENT
[FreeTextEntry1] : Ms. MAYS is a 55 year old woman with hyperparathyroidism, carpal tunnel syndrome, breast cancer s/p lumpectomy, mult. lung nodules, cervical dysplasia s/p total hysterectomy in 2012. FHx: alcoholic cirrhosis in father and uncle.\par \par - JACQUES\par   - US 10/2020: fatty liver\par   - fibroscan 1/25/21: stage 0-1 fibrosis, stage 3 steatosis. Pt. has never had a liver biopsy.\par   - intermittent AST/ALT elevation. Serologic workup negative for other cause.\par - obesity, BMI 34, has not lost weight since semaglutide increased to 7 mg/d.\par - mixed hyperlipidemia\par - glucose intolerance, in past managed by her PCP. Holding alogliptin since semaglutide started.\par \par - resolved painful epigastric bloating after eating: likely IBS. Has lactose intolerance. Does eat white bread and legumes now and then, but not daily. Resolved after starting semaglutide and eating less. \par - GERD. None since started eating less. Did not need her prn famotidine\par - pruritus: not likely related to liver disease\par \par - Immune to HAV, Immune to HBV\par - Colonoscopy: 2016 [facility] normal[facility]par \par Plan:\par - obesity: increase semaglutide to 14 mg/d PO. May need to switch to s.c. form if she does not lose weight.\par \par - return in 3 months after repeat labs\par

## 2021-02-11 NOTE — HISTORY OF PRESENT ILLNESS
[de-identified] : - 2/11/21: returns after increasing semaglutide to 7 mg/d PO, labs and fibroscan. Stopped alogliptin Weight stable although she reports eating smaller portions. No side effects. Noted small skin fold in jugular fossa.\par \par - 12/22/20: started semaglutide 3 mg/d - initially had some nausea which resolved, still feels fullness. weight 3 lbs lighter - uncertain if she really started losing weight.  Epigastric dyscomfort has resolved since 2nd week after starting semaglutide. She stopped taking alogliptin after talking to her endocrinologist. Fibroscan not done - she was not told about it when she checked out last time.\par \par - 11/19/20: initial visit with me, returns after last seen by DEBO Gallardo a month ago and before by Dr. Lin.  Had labs an US. Reports resolution of right flank pain and epigastric pain. R flank pain occurs often  -. Reports epigastric protrusion and painful bloating after certain food, pain radiates to right back. Today mild, but can be severe. Reportedly, US tech felt hardened muscles r flank. Reports epigastric pain after eating meat, greasy food. Pain also triggered by milk. She has had pruritus on arms, upper chest for 2-3 years, had an unrevealing skin biopsy and gets cremes by her dermatologist. Does not involve palms and soles.\par \par Weight hx: 203 lbs, BMI 34.9 as of 11/2020 - longstanding obesity for most of her adult life. Gained weight after her last pregnancy around 1992. Had lost 20 lbs around 3663-0685 when deployed, then regained.\par Alcohol hx: does not drink; drank socially when young, never heavily.\par \par Workup:\par - 2/11/21 fibroscan: 4.6 kPa, 377 dB/m - stage 0-1 fibrosis, stage 3 steatosis\par - 11/4/20 US abdomen: fatty liver, stable angiomyolipoma R kidney\par - 12/2013 US abdomen: hepatic steatosis, GB stones, no intrahepatic ductal dilatation.\par - 12/2015 liver biopsy per Dr. Lin's note: moderate to severe steatosis, increased portal fibrosis, mild chronic inflammation in portal area. \par - EGD from 9/2016 reportedly was unremarkable, but chronic gastritis\par - Esophageal motility in 4/2018  which reported was normal\par - Colonoscopy in 2016 reportedly was normal\par -  Fibroscan from 11/30/17 with a median liver stiffness of 5.8 kPa which is consistent with F0-F1 disease,  dB/m (S 3). She reports her father had alcohol cirrhosis\par - US of the abdomen from 04/22/19 showed hepatic steatosis.\par \par

## 2021-04-02 NOTE — ED ADULT NURSE NOTE - NS ED NURSE LEVEL OF CONSCIOUSNESS SPEECH
Speaking Coherently Tremfya Counseling: I discussed with the patient the risks of guselkumab including but not limited to immunosuppression, serious infections, worsening of inflammatory bowel disease and drug reactions.  The patient understands that monitoring is required including a PPD at baseline and must alert us or the primary physician if symptoms of infection or other concerning signs are noted.

## 2021-04-30 ENCOUNTER — NON-APPOINTMENT (OUTPATIENT)
Age: 56
End: 2021-04-30

## 2021-05-20 ENCOUNTER — APPOINTMENT (OUTPATIENT)
Dept: GYNECOLOGIC ONCOLOGY | Facility: CLINIC | Age: 56
End: 2021-05-20
Payer: COMMERCIAL

## 2021-05-20 VITALS
BODY MASS INDEX: 33.29 KG/M2 | WEIGHT: 195 LBS | SYSTOLIC BLOOD PRESSURE: 122 MMHG | HEART RATE: 71 BPM | DIASTOLIC BLOOD PRESSURE: 80 MMHG | HEIGHT: 64 IN

## 2021-05-20 LAB
ALBUMIN SERPL ELPH-MCNC: 4.5 G/DL
ALP BLD-CCNC: 126 U/L
ALT SERPL-CCNC: 34 U/L
ANION GAP SERPL CALC-SCNC: 10 MMOL/L
AST SERPL-CCNC: 22 U/L
BILIRUB SERPL-MCNC: 0.4 MG/DL
BUN SERPL-MCNC: 11 MG/DL
CALCIUM SERPL-MCNC: 9.8 MG/DL
CHLORIDE SERPL-SCNC: 105 MMOL/L
CHOLEST SERPL-MCNC: 197 MG/DL
CO2 SERPL-SCNC: 26 MMOL/L
CREAT SERPL-MCNC: 0.91 MG/DL
GLUCOSE SERPL-MCNC: 93 MG/DL
HDLC SERPL-MCNC: 39 MG/DL
LDLC SERPL CALC-MCNC: 126 MG/DL
NONHDLC SERPL-MCNC: 158 MG/DL
POTASSIUM SERPL-SCNC: 4.6 MMOL/L
PROT SERPL-MCNC: 7 G/DL
SODIUM SERPL-SCNC: 141 MMOL/L
TRIGL SERPL-MCNC: 162 MG/DL

## 2021-05-20 PROCEDURE — 57420 EXAM OF VAGINA W/SCOPE: CPT

## 2021-05-20 PROCEDURE — 99212 OFFICE O/P EST SF 10 MIN: CPT | Mod: 25

## 2021-05-21 LAB
ESTIMATED AVERAGE GLUCOSE: 120 MG/DL
HBA1C MFR BLD HPLC: 5.8 %
HPV HIGH+LOW RISK DNA PNL CVX: NOT DETECTED

## 2021-05-26 LAB — CYTOLOGY CVX/VAG DOC THIN PREP: ABNORMAL

## 2021-06-24 ENCOUNTER — APPOINTMENT (OUTPATIENT)
Dept: HEPATOLOGY | Facility: CLINIC | Age: 56
End: 2021-06-24
Payer: COMMERCIAL

## 2021-06-24 VITALS
HEART RATE: 76 BPM | RESPIRATION RATE: 14 BRPM | BODY MASS INDEX: 34.15 KG/M2 | OXYGEN SATURATION: 75 % | DIASTOLIC BLOOD PRESSURE: 83 MMHG | TEMPERATURE: 97.8 F | WEIGHT: 200 LBS | SYSTOLIC BLOOD PRESSURE: 125 MMHG | HEIGHT: 64 IN

## 2021-06-24 DIAGNOSIS — L29.9 PRURITUS, UNSPECIFIED: ICD-10-CM

## 2021-06-24 PROCEDURE — 99214 OFFICE O/P EST MOD 30 MIN: CPT

## 2021-06-24 NOTE — ASSESSMENT
[FreeTextEntry1] : Ms. MAYS is a 55 year old woman with hyperparathyroidism, carpal tunnel syndrome, breast cancer s/p lumpectomy, mult. lung nodules, cervical dysplasia s/p total hysterectomy in 2012. FHx: alcoholic cirrhosis in father and uncle.\par \par - JACQUES\par   - US 10/2020: fatty liver\par   - fibroscan 1/25/21: stage 0-1 fibrosis, stage 3 steatosis. Pt. has never had a liver biopsy.\par   - intermittent AST/ALT and ALP elevation. Serologic workup negative for other cause.\par - obesity, BMI 34: did not lose weight with Rybelsus 7 mg/d, and 14 mg/d was not covered by insurance. She lost 5-7 lbs when on Ozempic and after changing meals to two per day. (Her son made same changes, in addition to workout in gym and and lost 25 lbs). She is not sure if she ever saw a nutritionist.\par - DM2\par - mixed hyperlipidemia\par - glucose intolerance, managed by her PCP.\par \par - resolved painful epigastric bloating after eating: likely IBS. Has lactose intolerance. Does eat white bread and legumes now and then, but not daily. Resolved after starting semaglutide and eating less. \par - GERD. None since started eating less. Did not need her prn famotidine\par - pruritus: not likely related to liver disease. Almost resolved as of 6/2021 - was given a shampoo treatment by her dermatologist.\par \par - Immune to HAV, Immune to HBV\par - Colonoscopy: 2016 [facility] normal[facility].\par \par Plan:\par - obesity, DM2, JACQUES: will order Ozempic 0.25 mg/week s.c. and ask our pharmacist, Dr. Joanie Lin, about coverage.\par - physical exercise as tolerated with her R hip and L shoulder problems.\par - refer to nutritionist, Park Florez.\par - return in 1 month with DANNY Horton. If Ozempic not covered, can try liraglutide.\par

## 2021-06-24 NOTE — HISTORY OF PRESENT ILLNESS
[de-identified] : - 6/24/21: returns after repeat bloodwork and intended increase of PO semaglutide to 14 mg/d, but could not take it as copay of that dose was $800. Then stopped semaglutide and went back on alugliptin. Gained weight 5 lbs - thinks that it is because of recent L shoulder surgery as she gained weight after each of total 10 surgeries.  lbs, BMI 34.3.\par \par - 2/11/21: returns after increasing semaglutide to 7 mg/d PO, labs and fibroscan. Stopped alogliptin Weight stable although she reports eating smaller portions. No side effects. Noted small skin fold in jugular fossa.\par \par - 12/22/20: started semaglutide 3 mg/d - initially had some nausea which resolved, still feels fullness. weight 3 lbs lighter - uncertain if she really started losing weight.  Epigastric dyscomfort has resolved since 2nd week after starting semaglutide. She stopped taking alogliptin after talking to her endocrinologist. Fibroscan not done - she was not told about it when she checked out last time.\par \par - 11/19/20: initial visit with me, returns after last seen by DEBO Gallardo a month ago and before by Dr. Lin.  Had labs an US. Reports resolution of right flank pain and epigastric pain. R flank pain occurs often  -. Reports epigastric protrusion and painful bloating after certain food, pain radiates to right back. Today mild, but can be severe. Reportedly, US tech felt hardened muscles r flank. Reports epigastric pain after eating meat, greasy food. Pain also triggered by milk. She has had pruritus on arms, upper chest for 2-3 years, had an unrevealing skin biopsy and gets cremes by her dermatologist. Does not involve palms and soles.\par \par Weight hx: 203 lbs, BMI 34.9 as of 11/2020 - longstanding obesity for most of her adult life. Gained weight after her last pregnancy around 1992. Had lost 20 lbs around 7484-1320 when deployed, then regained.\par Alcohol hx: does not drink; drank socially when young, never heavily.\par \par Workup:\par - 2/11/21 fibroscan: 4.6 kPa, 377 dB/m - stage 0-1 fibrosis, stage 3 steatosis\par - 11/4/20 US abdomen: fatty liver, stable angiomyolipoma R kidney\par - 12/2013 US abdomen: hepatic steatosis, GB stones, no intrahepatic ductal dilatation.\par - 12/2015 liver biopsy per Dr. Lin's note: moderate to severe steatosis, increased portal fibrosis, mild chronic inflammation in portal area. \par - EGD from 9/2016 reportedly was unremarkable, but chronic gastritis\par - Esophageal motility in 4/2018  which reported was normal\par - Colonoscopy in 2016 reportedly was normal\par -  Fibroscan from 11/30/17 with a median liver stiffness of 5.8 kPa which is consistent with F0-F1 disease,  dB/m (S 3). She reports her father had alcohol cirrhosis\par - US of the abdomen from 04/22/19 showed hepatic steatosis.\par \par

## 2021-07-23 ENCOUNTER — NON-APPOINTMENT (OUTPATIENT)
Age: 56
End: 2021-07-23

## 2021-07-30 ENCOUNTER — APPOINTMENT (OUTPATIENT)
Dept: HEPATOLOGY | Facility: CLINIC | Age: 56
End: 2021-07-30

## 2021-09-16 ENCOUNTER — APPOINTMENT (OUTPATIENT)
Dept: OTHER | Facility: CLINIC | Age: 56
End: 2021-09-16
Payer: COMMERCIAL

## 2021-09-16 PROCEDURE — 99396 PREV VISIT EST AGE 40-64: CPT | Mod: 95

## 2021-09-16 PROCEDURE — 99443: CPT | Mod: 95

## 2021-09-20 ENCOUNTER — FORM ENCOUNTER (OUTPATIENT)
Age: 56
End: 2021-09-20

## 2021-09-24 ENCOUNTER — APPOINTMENT (OUTPATIENT)
Dept: HEPATOLOGY | Facility: CLINIC | Age: 56
End: 2021-09-24
Payer: COMMERCIAL

## 2021-09-24 VITALS
HEART RATE: 76 BPM | WEIGHT: 196 LBS | BODY MASS INDEX: 33.46 KG/M2 | DIASTOLIC BLOOD PRESSURE: 80 MMHG | HEIGHT: 64 IN | SYSTOLIC BLOOD PRESSURE: 121 MMHG | TEMPERATURE: 98.2 F | RESPIRATION RATE: 14 BRPM

## 2021-09-24 PROCEDURE — 99214 OFFICE O/P EST MOD 30 MIN: CPT

## 2021-09-24 RX ORDER — HYDROCORTISONE 25 MG/G
2.5 CREAM TOPICAL
Qty: 30 | Refills: 0 | Status: DISCONTINUED | COMMUNITY
Start: 2018-07-26 | End: 2021-09-24

## 2021-09-24 RX ORDER — MELOXICAM 15 MG/1
TABLET ORAL
Refills: 0 | Status: ACTIVE | COMMUNITY

## 2021-09-24 RX ORDER — SEMAGLUTIDE 1.34 MG/ML
2 INJECTION, SOLUTION SUBCUTANEOUS
Qty: 1 | Refills: 2 | Status: DISCONTINUED | COMMUNITY
Start: 2021-06-24 | End: 2021-09-24

## 2021-09-24 RX ORDER — AMITRIPTYLINE HYDROCHLORIDE 25 MG/1
25 TABLET, FILM COATED ORAL
Qty: 30 | Refills: 0 | Status: DISCONTINUED | COMMUNITY
Start: 2019-04-30 | End: 2021-09-24

## 2021-09-24 RX ORDER — HYDROXYZINE HYDROCHLORIDE 10 MG/1
10 TABLET ORAL
Refills: 0 | Status: DISCONTINUED | COMMUNITY
End: 2021-09-24

## 2021-09-24 NOTE — HISTORY OF PRESENT ILLNESS
[de-identified] : - 9/24/21: returns after bloodwork. Ozempic was not covered either. Lost the information of the nutritionist. Eats two meals/day, 4 lbs lighter today than in June.\par \par - 6/24/21: returns after repeat bloodwork and intended increase of PO semaglutide to 14 mg/d, but could not take it as copay of that dose was $800. Then stopped semaglutide and went back on alugliptin. Gained weight 5 lbs - thinks that it is because of recent L shoulder surgery as she gained weight after each of total 10 surgeries.  lbs, BMI 34.3.\par \par - 2/11/21: returns after increasing semaglutide to 7 mg/d PO, labs and fibroscan. Stopped alogliptin Weight stable although she reports eating smaller portions. No side effects. Noted small skin fold in jugular fossa.\par \par - 12/22/20: started semaglutide 3 mg/d - initially had some nausea which resolved, still feels fullness. weight 3 lbs lighter - uncertain if she really started losing weight.  Epigastric dyscomfort has resolved since 2nd week after starting semaglutide. She stopped taking alogliptin after talking to her endocrinologist. Fibroscan not done - she was not told about it when she checked out last time.\par \par - 11/19/20: initial visit with me, returns after last seen by DEBO Gallardo a month ago and before by Dr. Lin.  Had labs an US. Reports resolution of right flank pain and epigastric pain. R flank pain occurs often  -. Reports epigastric protrusion and painful bloating after certain food, pain radiates to right back. Today mild, but can be severe. Reportedly, US tech felt hardened muscles r flank. Reports epigastric pain after eating meat, greasy food. Pain also triggered by milk. She has had pruritus on arms, upper chest for 2-3 years, had an unrevealing skin biopsy and gets cremes by her dermatologist. Does not involve palms and soles.\par \par Weight hx: 203 lbs, BMI 34.9 as of 11/2020 - longstanding obesity for most of her adult life. Gained weight after her last pregnancy around 1992. Had lost 20 lbs around 4698-4562 when deployed, then regained.\par Alcohol hx: does not drink; drank socially when young, never heavily.\par \par Workup:\par - 2/11/21 fibroscan: 4.6 kPa, 377 dB/m - stage 0-1 fibrosis, stage 3 steatosis\par - 11/4/20 US abdomen: fatty liver, stable angiomyolipoma R kidney\par - 12/2013 US abdomen: hepatic steatosis, GB stones, no intrahepatic ductal dilatation.\par - 12/2015 liver biopsy per Dr. Lin's note: moderate to severe steatosis, increased portal fibrosis, mild chronic inflammation in portal area. \par - EGD from 9/2016 reportedly was unremarkable, but chronic gastritis\par - Esophageal motility in 4/2018  which reported was normal\par - Colonoscopy in 2016 reportedly was normal\par -  Fibroscan from 11/30/17 with a median liver stiffness of 5.8 kPa which is consistent with F0-F1 disease,  dB/m (S 3). She reports her father had alcohol cirrhosis\par - US of the abdomen from 04/22/19 showed hepatic steatosis.\par \par

## 2021-09-24 NOTE — ASSESSMENT
[FreeTextEntry1] : Ms. MAYS is a 55 year old woman with hyperparathyroidism, carpal tunnel syndrome, breast cancer s/p lumpectomy, mult. lung nodules, cervical dysplasia s/p total hysterectomy in 2012. FHx: alcoholic cirrhosis in father and uncle.\par \par - JACQUES without fibrosis\par   - US 10/2020: fatty liver\par   - fibroscan 1/25/21: stage 0-1 fibrosis, stage 3 steatosis. Pt. has never had a liver biopsy (but one mentioned in old notes)\par   - intermittent AST/ALT and ALP elevation. Serologic workup negative for other cause.\par - obesity, BMI 34: did not lose weight with Rybelsus 7 mg/d, and 14 mg/d was not covered by insurance. Ozempic was not covered by her insurance in 6/2021. (Her son made same changes, in addition to workout in gym and and lost 25 lbs). \par - DM2\par - mixed hyperlipidemia\par - glucose intolerance, managed by her PCP.\par \par - resolved painful epigastric bloating after eating: likely IBS. Has lactose intolerance. Does eat white bread and legumes now and then, but not daily. Resolved after starting semaglutide and eating less. \par - GERD. None since started eating less. Did not need her prn famotidine\par - pruritus: not likely related to liver disease. Almost resolved as of 6/2021 - was given a shampoo treatment by her dermatologist.\par \par - Immune to HAV, Immune to HBV\par - [facility]: [facility]par \par Plan:\par - obesity, DM2, JACQUES: will order liraglutide\par - physical exercise as tolerated with her R hip and L shoulder problems.\par - refer again to nutritionist, Park Florez.\par - return in 2 months. If liraglutide approved, may increase dose if tolerated. If not, would try Wegovy.\par

## 2021-09-24 NOTE — REVIEW OF SYSTEMS
[Recent Weight Gain (___ Lbs)] : recent [unfilled] ~Ulb weight gain [Abdominal Pain] : abdominal pain [Heartburn] : heartburn [As Noted in HPI] : as noted in HPI [Arthralgias] : arthralgias [Negative] : Heme/Lymph [Fever] : no fever [Chills] : no chills [Feeling Poorly] : not feeling poorly [Feeling Tired] : not feeling tired [Recent Weight Loss (___ Lbs)] : no recent weight loss [Vomiting] : no vomiting [Constipation] : no constipation [Diarrhea] : no diarrhea [Melena] : no melena

## 2021-11-18 ENCOUNTER — APPOINTMENT (OUTPATIENT)
Dept: GYNECOLOGIC ONCOLOGY | Facility: CLINIC | Age: 56
End: 2021-11-18
Payer: COMMERCIAL

## 2021-11-18 VITALS — DIASTOLIC BLOOD PRESSURE: 87 MMHG | SYSTOLIC BLOOD PRESSURE: 134 MMHG | HEART RATE: 81 BPM

## 2021-11-18 DIAGNOSIS — Z09 ENCOUNTER FOR FOLLOW-UP EXAMINATION AFTER COMPLETED TREATMENT FOR CONDITIONS OTHER THAN MALIGNANT NEOPLASM: ICD-10-CM

## 2021-11-18 PROCEDURE — 57421 EXAM/BIOPSY OF VAG W/SCOPE: CPT

## 2021-11-18 PROCEDURE — 99213 OFFICE O/P EST LOW 20 MIN: CPT | Mod: 25

## 2021-11-18 RX ORDER — IPRATROPIUM BROMIDE 42 UG/1
0.06 SPRAY NASAL
Refills: 0 | Status: DISCONTINUED | COMMUNITY
End: 2021-11-18

## 2021-11-18 RX ORDER — BUDESONIDE AND FORMOTEROL FUMARATE DIHYDRATE 160; 4.5 UG/1; UG/1
160-4.5 AEROSOL RESPIRATORY (INHALATION)
Refills: 0 | Status: DISCONTINUED | COMMUNITY
End: 2021-11-18

## 2021-11-18 RX ORDER — LIRAGLUTIDE 6 MG/ML
18 INJECTION, SOLUTION SUBCUTANEOUS DAILY
Qty: 5 | Refills: 2 | Status: DISCONTINUED | COMMUNITY
Start: 2021-09-24 | End: 2021-11-18

## 2021-11-22 LAB — HPV HIGH+LOW RISK DNA PNL CVX: NOT DETECTED

## 2021-11-23 ENCOUNTER — APPOINTMENT (OUTPATIENT)
Dept: HEPATOLOGY | Facility: CLINIC | Age: 56
End: 2021-11-23

## 2021-11-29 LAB
ALBUMIN SERPL ELPH-MCNC: 4.2 G/DL
ALP BLD-CCNC: 127 U/L
ALT SERPL-CCNC: 31 U/L
ANION GAP SERPL CALC-SCNC: 10 MMOL/L
AST SERPL-CCNC: 14 U/L
BILIRUB SERPL-MCNC: 0.4 MG/DL
BUN SERPL-MCNC: 12 MG/DL
CALCIUM SERPL-MCNC: 9.6 MG/DL
CHLORIDE SERPL-SCNC: 106 MMOL/L
CHOLEST SERPL-MCNC: 266 MG/DL
CO2 SERPL-SCNC: 26 MMOL/L
CREAT SERPL-MCNC: 0.82 MG/DL
GLUCOSE SERPL-MCNC: 89 MG/DL
HDLC SERPL-MCNC: 48 MG/DL
LDLC SERPL CALC-MCNC: 184 MG/DL
NONHDLC SERPL-MCNC: 218 MG/DL
POTASSIUM SERPL-SCNC: 4.2 MMOL/L
PROT SERPL-MCNC: 6.8 G/DL
SODIUM SERPL-SCNC: 142 MMOL/L
TRIGL SERPL-MCNC: 170 MG/DL

## 2021-11-30 ENCOUNTER — APPOINTMENT (OUTPATIENT)
Dept: HEPATOLOGY | Facility: CLINIC | Age: 56
End: 2021-11-30
Payer: COMMERCIAL

## 2021-11-30 VITALS
RESPIRATION RATE: 14 BRPM | SYSTOLIC BLOOD PRESSURE: 121 MMHG | HEIGHT: 64 IN | OXYGEN SATURATION: 99 % | WEIGHT: 197 LBS | TEMPERATURE: 97.8 F | BODY MASS INDEX: 33.63 KG/M2 | DIASTOLIC BLOOD PRESSURE: 81 MMHG | HEART RATE: 70 BPM

## 2021-11-30 DIAGNOSIS — K75.81 NONALCOHOLIC STEATOHEPATITIS (NASH): ICD-10-CM

## 2021-11-30 DIAGNOSIS — E66.9 OBESITY, UNSPECIFIED: ICD-10-CM

## 2021-11-30 DIAGNOSIS — E11.9 TYPE 2 DIABETES MELLITUS W/OUT COMPLICATIONS: ICD-10-CM

## 2021-11-30 PROCEDURE — 99213 OFFICE O/P EST LOW 20 MIN: CPT

## 2021-11-30 NOTE — HISTORY OF PRESENT ILLNESS
[de-identified] : - 11/30/21: returns after bloodwork. I had ordered liraglutide/Saxenda, but then Ozempic was covered - taking 0.5 mg/week. Now feels full and states that she eats less - only 1/2 plate usually. Is finishing a 3-week course of prednisolone PO (dose unknown) for a sinus infection and has not lost weight. Had only lost 4 lbs in 6 weeks before the prednisone, i.e. nothing.\par \par - 9/24/21: returns after bloodwork. Ozempic was not covered either. Lost the information of the nutritionist. Eats two meals/day, 4 lbs lighter today than in June.\par \par - 6/24/21: returns after repeat bloodwork and intended increase of PO semaglutide to 14 mg/d, but could not take it as copay of that dose was $800. Then stopped semaglutide and went back on alugliptin. Gained weight 5 lbs - thinks that it is because of recent L shoulder surgery as she gained weight after each of total 10 surgeries.  lbs, BMI 34.3.\par \par - 2/11/21: returns after increasing semaglutide to 7 mg/d PO, labs and fibroscan. Stopped alogliptin Weight stable although she reports eating smaller portions. No side effects. Noted small skin fold in jugular fossa.\par \par - 12/22/20: started semaglutide 3 mg/d - initially had some nausea which resolved, still feels fullness. weight 3 lbs lighter - uncertain if she really started losing weight.  Epigastric dyscomfort has resolved since 2nd week after starting semaglutide. She stopped taking alogliptin after talking to her endocrinologist. Fibroscan not done - she was not told about it when she checked out last time.\par \par - 11/19/20: initial visit with me, returns after last seen by DEBO Gallardo a month ago and before by Dr. Lin.  Had labs an US. Reports resolution of right flank pain and epigastric pain. R flank pain occurs often  -. Reports epigastric protrusion and painful bloating after certain food, pain radiates to right back. Today mild, but can be severe. Reportedly, US tech felt hardened muscles r flank. Reports epigastric pain after eating meat, greasy food. Pain also triggered by milk. She has had pruritus on arms, upper chest for 2-3 years, had an unrevealing skin biopsy and gets cremes by her dermatologist. Does not involve palms and soles.\par \par Weight hx: 203 lbs, BMI 34.9 as of 11/2020 - longstanding obesity for most of her adult life. Gained weight after her last pregnancy around 1992. 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 Lin's note: moderate to severe steatosis, increased portal fibrosis, mild chronic inflammation in portal area. \par - EGD from 9/2016 reportedly was unremarkable, but chronic gastritis\par - Esophageal motility in 4/2018  which reported was normal\par - Colonoscopy in 2016 reportedly was normal\par -  Fibroscan from 11/30/17 with a median liver stiffness of 5.8 kPa which is consistent with F0-F1 disease,  dB/m (S 3). She reports her father had alcohol cirrhosis\par - US of the abdomen from 04/22/19 showed hepatic steatosis.\par \par

## 2021-11-30 NOTE — ASSESSMENT
[FreeTextEntry1] : Ms. MAYS is a 55 year old woman with hyperparathyroidism, carpal tunnel syndrome, breast cancer s/p lumpectomy, mult. lung nodules, cervical dysplasia s/p total hysterectomy in 2012. FHx: alcoholic cirrhosis in father and uncle.\par \par - JACQUES without fibrosis\par   - US 10/2020: fatty liver\par   - fibroscan 1/25/21: stage 0-1 fibrosis, stage 3 steatosis. Pt. has never had a liver biopsy (but one mentioned in old notes)\par   - intermittent AST/ALT and ALP elevation. Serologic workup negative for other cause.\par - obesity, BMI 34: did not lose weight with Rybelsus 7 mg/d, and 14 mg/d was not covered by insurance. Ozempic was not covered by her insurance in 6/2021, then covered 9/2021- (0.5 mg/week). Has not lost weight - was taking prednisolone PO for sinus infection until 11/30/21 but had not lost weight before that in 6 weeks\par - DM2\par - mixed hyperlipidemia\par - glucose intolerance, managed by her PCP.\par \par - resolved painful epigastric bloating after eating: likely IBS. Has lactose intolerance. Does eat white bread and legumes now and then, but not daily. Resolved after started Rybelsus and ate less. \par - GERD. None since started eating less. Did not need her prn famotidine\par - pruritus: not likely related to liver disease. Almost resolved as of 6/2021 - was given a shampoo treatment by her dermatologist.\par \par - Immune to HAV, Immune to HBV\par - [facility]: [facility]par \par Plan:\par - obesity, DM2, JACQUES: increase Ozempic to 1.0 mg/week\par - physical exercise as tolerated with her R hip and L shoulder problems.\par - refer again to nutritionistPark.\par - return in 3 months after repeat bloodwork\par

## 2021-12-01 LAB
ESTIMATED AVERAGE GLUCOSE: 128 MG/DL
HBA1C MFR BLD HPLC: 6.1 %

## 2021-12-09 LAB
CORE LAB BIOPSY: NORMAL
CYTOLOGY CVX/VAG DOC THIN PREP: ABNORMAL

## 2021-12-10 NOTE — ED ADULT NURSE NOTE - NSSUSCREENINGQ2_ED_ALL_ED
Thank you for visiting the [facility], [place]     It is difficult to recognize all elements of any illness or injury in a single visit. The examination, treatment, and x-rays received are on a preliminary basis only. A radiologist will also review your x-rays for final reading. Call your primary care provider if you have questions or problems before your next appointment. If you are unable to  reach your Primary Care Provider (PCP), please call or return to the Walk-In Clinic.  If symptoms worsen or do not resolve please follow-up with your Primary Care Provider (PCP), Walk-In or the nearest  Emergency Room for emergency symptoms.  If you are unsure of whom to follow up with, call [phone] and ask to speak with either your PCP, the Walk-In nurse or the on-call Provider if you are calling after hours.      If you are referred to a specialist or scheduled for a test, our Referrals department will call you with your appointment date and time within 3 business days. If you have not heard from them in this time frame, please call [phone] and ask for the Referrals department.     Test results: Unless otherwise instructed, you should be notified of test results within one week. Please call our office if you do not hear from us within this time frame at [phone].     Hours:  Monday through Friday: 7:00 am to 7:00 pm  Saturday: 7:00 am to 3:00 pm  Sunday: 7:00 am to 3:00 pm.  Holiday hours may vary, please call [phone] on Holidays.  Walk-In is closed on Thanksgiving Day, Lenny Day and  New Year's Day.      Thank you again for visiting [facility] [place].  Nathaniel Zeng MD       No

## 2022-02-08 ENCOUNTER — NON-APPOINTMENT (OUTPATIENT)
Age: 57
End: 2022-02-08

## 2022-02-08 ENCOUNTER — APPOINTMENT (OUTPATIENT)
Dept: HEPATOLOGY | Facility: CLINIC | Age: 57
End: 2022-02-08

## 2022-02-08 VITALS
HEIGHT: 64 IN | OXYGEN SATURATION: 97 % | DIASTOLIC BLOOD PRESSURE: 83 MMHG | HEART RATE: 71 BPM | BODY MASS INDEX: 33.29 KG/M2 | TEMPERATURE: 98 F | WEIGHT: 195 LBS | SYSTOLIC BLOOD PRESSURE: 122 MMHG | RESPIRATION RATE: 14 BRPM

## 2022-02-10 ENCOUNTER — EMERGENCY (EMERGENCY)
Facility: HOSPITAL | Age: 57
LOS: 1 days | Discharge: HOME | End: 2022-02-10
Admitting: EMERGENCY MEDICINE
Payer: COMMERCIAL

## 2022-02-10 VITALS
OXYGEN SATURATION: 100 % | DIASTOLIC BLOOD PRESSURE: 80 MMHG | RESPIRATION RATE: 16 BRPM | TEMPERATURE: 98 F | HEART RATE: 97 BPM | SYSTOLIC BLOOD PRESSURE: 124 MMHG | HEIGHT: 64 IN

## 2022-02-10 DIAGNOSIS — Z90.710 ACQUIRED ABSENCE OF BOTH CERVIX AND UTERUS: Chronic | ICD-10-CM

## 2022-02-10 PROCEDURE — 99285 EMERGENCY DEPT VISIT HI MDM: CPT

## 2022-02-10 NOTE — ED ADULT TRIAGE NOTE - CHIEF COMPLAINT QUOTE
c/o bloating, n/v/d, LLQ abd pain x 2 weeks. also "asthma attack", sob, chest tightness, rapid heart beat x 10 days. also headache since today. hx asthma, GERD, steatosis, hld, pre-diabetes. recently increased ozembic dose about a month ago.

## 2022-02-10 NOTE — ED ADULT TRIAGE NOTE - HISTORY OF COVID-19 VACCINATION
.Continuous Glucose Monitor (CGMS)    HOME INSTRUCTIONS    The Continuous Glucose Monitor (CGM) is a small sensor, worn on the outside of your skin, which will continuously record your glucose 24 hours a day. It will keep track of your glucose while you play, work, and sleep. This will give you and your healthcare provider valuable information for improving your diabetes treatment.    The following is information about wearing the CGM, which you should know:    • Wear the device continuously.  If you experience redness, pain, tenderness or swelling at the insertion site, remove the entire device like removing a Band-Aid.  - It is waterproof for showers, baths and swimming as long as the sensor stays connected to the device.  - The device must NOT be worn in a Hot Tub.  - Protect the sensor site to avoid accidental removal.  - Follow your usual diabetes treatment, unless instructed otherwise.  - Insulin must NOT be given near the sensor site.  - Remove the CGM before having an X-ray, CT scan or MRI.  - Caiibrate as instructed.  Minimum of once every 12 hours after initial warm up period.     Follow up with educator PRN.      
Yes

## 2022-02-11 LAB
ALBUMIN SERPL ELPH-MCNC: 3.8 G/DL — SIGNIFICANT CHANGE UP (ref 3.3–5)
ALP SERPL-CCNC: 131 U/L — HIGH (ref 40–120)
ALT FLD-CCNC: 39 U/L — HIGH (ref 4–33)
ANION GAP SERPL CALC-SCNC: 11 MMOL/L — SIGNIFICANT CHANGE UP (ref 7–14)
AST SERPL-CCNC: 22 U/L — SIGNIFICANT CHANGE UP (ref 4–32)
BASOPHILS # BLD AUTO: 0.02 K/UL — SIGNIFICANT CHANGE UP (ref 0–0.2)
BASOPHILS NFR BLD AUTO: 0.2 % — SIGNIFICANT CHANGE UP (ref 0–2)
BILIRUB SERPL-MCNC: 0.2 MG/DL — SIGNIFICANT CHANGE UP (ref 0.2–1.2)
BUN SERPL-MCNC: 12 MG/DL — SIGNIFICANT CHANGE UP (ref 7–23)
CALCIUM SERPL-MCNC: 9 MG/DL — SIGNIFICANT CHANGE UP (ref 8.4–10.5)
CHLORIDE SERPL-SCNC: 104 MMOL/L — SIGNIFICANT CHANGE UP (ref 98–107)
CO2 SERPL-SCNC: 22 MMOL/L — SIGNIFICANT CHANGE UP (ref 22–31)
CREAT SERPL-MCNC: 0.75 MG/DL — SIGNIFICANT CHANGE UP (ref 0.5–1.3)
EOSINOPHIL # BLD AUTO: 0.23 K/UL — SIGNIFICANT CHANGE UP (ref 0–0.5)
EOSINOPHIL NFR BLD AUTO: 2.8 % — SIGNIFICANT CHANGE UP (ref 0–6)
GLUCOSE SERPL-MCNC: 118 MG/DL — HIGH (ref 70–99)
HCT VFR BLD CALC: 38.4 % — SIGNIFICANT CHANGE UP (ref 34.5–45)
HGB BLD-MCNC: 12.2 G/DL — SIGNIFICANT CHANGE UP (ref 11.5–15.5)
IANC: 4.24 K/UL — SIGNIFICANT CHANGE UP (ref 1.5–8.5)
IMM GRANULOCYTES NFR BLD AUTO: 0.2 % — SIGNIFICANT CHANGE UP (ref 0–1.5)
LYMPHOCYTES # BLD AUTO: 3.24 K/UL — SIGNIFICANT CHANGE UP (ref 1–3.3)
LYMPHOCYTES # BLD AUTO: 39.2 % — SIGNIFICANT CHANGE UP (ref 13–44)
MCHC RBC-ENTMCNC: 27.5 PG — SIGNIFICANT CHANGE UP (ref 27–34)
MCHC RBC-ENTMCNC: 31.8 GM/DL — LOW (ref 32–36)
MCV RBC AUTO: 86.7 FL — SIGNIFICANT CHANGE UP (ref 80–100)
MONOCYTES # BLD AUTO: 0.51 K/UL — SIGNIFICANT CHANGE UP (ref 0–0.9)
MONOCYTES NFR BLD AUTO: 6.2 % — SIGNIFICANT CHANGE UP (ref 2–14)
NEUTROPHILS # BLD AUTO: 4.24 K/UL — SIGNIFICANT CHANGE UP (ref 1.8–7.4)
NEUTROPHILS NFR BLD AUTO: 51.4 % — SIGNIFICANT CHANGE UP (ref 43–77)
NRBC # BLD: 0 /100 WBCS — SIGNIFICANT CHANGE UP
NRBC # FLD: 0 K/UL — SIGNIFICANT CHANGE UP
NT-PROBNP SERPL-SCNC: 6 PG/ML — SIGNIFICANT CHANGE UP
PLATELET # BLD AUTO: 237 K/UL — SIGNIFICANT CHANGE UP (ref 150–400)
POTASSIUM SERPL-MCNC: 4 MMOL/L — SIGNIFICANT CHANGE UP (ref 3.5–5.3)
POTASSIUM SERPL-SCNC: 4 MMOL/L — SIGNIFICANT CHANGE UP (ref 3.5–5.3)
PROT SERPL-MCNC: 6.6 G/DL — SIGNIFICANT CHANGE UP (ref 6–8.3)
RBC # BLD: 4.43 M/UL — SIGNIFICANT CHANGE UP (ref 3.8–5.2)
RBC # FLD: 13.1 % — SIGNIFICANT CHANGE UP (ref 10.3–14.5)
SODIUM SERPL-SCNC: 137 MMOL/L — SIGNIFICANT CHANGE UP (ref 135–145)
TROPONIN T, HIGH SENSITIVITY RESULT: <6 NG/L — SIGNIFICANT CHANGE UP
WBC # BLD: 8.26 K/UL — SIGNIFICANT CHANGE UP (ref 3.8–10.5)
WBC # FLD AUTO: 8.26 K/UL — SIGNIFICANT CHANGE UP (ref 3.8–10.5)

## 2022-02-11 PROCEDURE — 71046 X-RAY EXAM CHEST 2 VIEWS: CPT | Mod: 26

## 2022-02-11 RX ORDER — IPRATROPIUM/ALBUTEROL SULFATE 18-103MCG
3 AEROSOL WITH ADAPTER (GRAM) INHALATION
Refills: 0 | Status: DISCONTINUED | OUTPATIENT
Start: 2022-02-11 | End: 2022-02-14

## 2022-02-11 RX ADMIN — Medication 3 MILLILITER(S): at 01:03

## 2022-02-11 RX ADMIN — Medication 125 MILLIGRAM(S): at 00:47

## 2022-02-11 RX ADMIN — Medication 3 MILLILITER(S): at 00:45

## 2022-02-11 NOTE — ED ADULT NURSE NOTE - OBJECTIVE STATEMENT
55yo female received in room 12. pt A&Ox4, ambulatory, c/o chest tightness and sob for a week, states it feel like her usual asthma attack. has tried using rescue inhaler at home without much improvement. pt sating 100% on room air. 20G IV placed in RAC, lab drawn and sent. MD ramon in progress. safety maintained. Medicated as per order.

## 2022-02-11 NOTE — ED PROVIDER NOTE - NSICDXPASTMEDICALHX_GEN_ALL_CORE_FT
PAST MEDICAL HISTORY:  Anemia     Anxiety     Asthma     Borderline diabetic     Gastroenteritis     GERD (gastroesophageal reflux disease)     History of seasonal allergies     HLD (hyperlipidemia)     Human papillomavirus (HPV)     Lumbar spine pain due to herniated disc    Uterine leiomyoma

## 2022-02-11 NOTE — ED PROVIDER NOTE - NSICDXPASTSURGICALHX_GEN_ALL_CORE_FT
PAST SURGICAL HISTORY:  Breast cyst Left breast cyst aspiration 10/2010    H/O hysterectomy for benign disease     History of hammer toe correction Bilateral in 7/2011    History of lipoma removed from back 8/2011    History of prior ablation treatment Uterine Ablation due to fibroids    S/P bunionectomy Bilateral in 7/2011

## 2022-02-11 NOTE — ED PROVIDER NOTE - OBJECTIVE STATEMENT
57 y/o F h/o asthma (no intubations), hld p/w asthma exacerbation x 4 days. Pt states since returning [place] from [facility] she has been having worsening asthma attacks. Has been using inhaler every 4 hours with some relief. Pt denies chest pain. No recent prednisone use. Pt also reports LLQ pain x 1 month, states since being in [facility] for the past month her diet changed and she noted she started to develop LLQ. Pain has been intermittent. No n/v/d/c. No dysuria, hematuria, back pain.

## 2022-02-11 NOTE — ED PROVIDER NOTE - PROGRESS NOTE DETAILS
Pt feeling better. SOB improved. Trop negative. Labs unremarkable. CXR negative. Advised to follow up with PCP within 1-2 days. Instructed to return to Emergency Department if any new or worsening symptoms. Pt verbalizes agreement and understanding. VSS.

## 2022-02-11 NOTE — ED PROVIDER NOTE - CLINICAL SUMMARY MEDICAL DECISION MAKING FREE TEXT BOX
55 y/o F h/o asthma (no intubations), hld p/w asthma exacerbation x 4 days.   Diffuse wheezing on exam.   LLQ pain - abd soft non tender - no indication for imaging at this time.   plan  - labs  - cxr  - nebs  - IV steroids  - reassess

## 2022-04-13 ENCOUNTER — APPOINTMENT (OUTPATIENT)
Dept: HEPATOLOGY | Facility: CLINIC | Age: 57
End: 2022-04-13

## 2022-05-02 ENCOUNTER — APPOINTMENT (OUTPATIENT)
Dept: HEPATOLOGY | Facility: CLINIC | Age: 57
End: 2022-05-02
Payer: COMMERCIAL

## 2022-05-02 VITALS
BODY MASS INDEX: 33.7 KG/M2 | OXYGEN SATURATION: 99 % | TEMPERATURE: 98 F | HEIGHT: 63.75 IN | SYSTOLIC BLOOD PRESSURE: 117 MMHG | DIASTOLIC BLOOD PRESSURE: 77 MMHG | RESPIRATION RATE: 14 BRPM | WEIGHT: 195 LBS | HEART RATE: 71 BPM

## 2022-05-02 DIAGNOSIS — R74.8 ABNORMAL LEVELS OF OTHER SERUM ENZYMES: ICD-10-CM

## 2022-05-02 PROCEDURE — 99214 OFFICE O/P EST MOD 30 MIN: CPT

## 2022-05-02 RX ORDER — PREDNISONE 50 MG/1
TABLET ORAL
Refills: 0 | Status: DISCONTINUED | COMMUNITY
End: 2022-05-02

## 2022-05-02 RX ORDER — FESOTERODINE FUMARATE 4 MG/1
4 TABLET, FILM COATED, EXTENDED RELEASE ORAL
Refills: 0 | Status: DISCONTINUED | COMMUNITY
End: 2022-05-02

## 2022-05-02 RX ORDER — SEMAGLUTIDE 1.34 MG/ML
4 INJECTION, SOLUTION SUBCUTANEOUS
Qty: 9 | Refills: 0 | Status: DISCONTINUED | COMMUNITY
Start: 2021-10-12 | End: 2022-05-02

## 2022-05-02 RX ORDER — CARBOXYMETHYLCELLULOSE SODIUM 0.5 G/100ML
0.5 SOLUTION/ DROPS OPHTHALMIC
Qty: 30 | Refills: 0 | Status: DISCONTINUED | COMMUNITY
Start: 2019-05-29 | End: 2022-05-02

## 2022-05-02 NOTE — HISTORY OF PRESENT ILLNESS
[de-identified] : - 2/11/21: returns after increasing semaglutide to 7 mg/d PO, labs and fibroscan. Stopped alogliptin Weight stable although she reports eating smaller portions. No side effects. Noted small skin fold in jugular fossa.\par \par - 12/22/20: started semaglutide 3 mg/d - initially had some nausea which resolved, still feels fullness. weight 3 lbs lighter - uncertain if she really started losing weight.  Epigastric dyscomfort has resolved since 2nd week after starting semaglutide. She stopped taking alogliptin after talking to her endocrinologist. Fibroscan not done - she was not told about it when she checked out last time.\par \par - 11/19/20: initial visit with me, returns after last seen by DEBO Gallardo a month ago and before by Dr. Lin.  Had labs an US. Reports resolution of right flank pain and epigastric pain. R flank pain occurs often  -. Reports epigastric protrusion and painful bloating after certain food, pain radiates to right back. Today mild, but can be severe. Reportedly, US tech felt hardened muscles r flank. Reports epigastric pain after eating meat, greasy food. Pain also triggered by milk. She has had pruritus on arms, upper chest for 2-3 years, had an unrevealing skin biopsy and gets cremes by her dermatologist. Does not involve palms and soles.\par \par Weight hx: 203 lbs, BMI 34.9 as of 11/2020 - longstanding obesity for most of her adult life. Gained weight after her last pregnancy around 1992. 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 Lin's note: moderate to severe steatosis, increased portal fibrosis, mild chronic inflammation in portal area. \par - EGD from 9/2016 reportedly was unremarkable, but chronic gastritis\par - Esophageal motility in 4/2018  which reported was normal\par - Colonoscopy in 2016 reportedly was normal\par -  Fibroscan from 11/30/17 with a median liver stiffness of 5.8 kPa which is consistent with F0-F1 disease,  dB/m (S 3). She reports her father had alcohol cirrhosis\par - US of the abdomen from 04/22/19 showed hepatic steatosis.\par \par 6/24/21: returns after repeat bloodwork and intended increase of PO semaglutide to 14 mg/d, but could not take it as copay of that dose was $800. Then stopped semaglutide and went back on alugliptin. Gained weight 5 lbs - thinks that it is because of recent L shoulder surgery as she gained weight after each of total 10 surgeries.  lbs, BMI 34.3.\par \par  9/24/21: returns after bloodwork. Ozempic was not covered either. Lost the information of the nutritionist. Eats two meals/day, 4 lbs lighter today than in June.\par \par 11/30/21: returns after bloodwork. I had ordered liraglutide/Saxenda, but then Ozempic was covered - taking 0.5 mg/week. Now feels full and states that she eats less - only 1/2 plate usually. Is finishing a 3week course of prednisolone PO (dose unknown) for a sinus infection and has not lost weight. Had only lost 4 lbs in 6 weeks before the prednisone, i.e. nothing.\par \par 5/2/22 visit - here for f/u with me. previously seen by Dr. Bah up until 11/2021. she did not tolerate Ozempic due to a variety of side effects, bloating and she was not losing any weight. She is back on Alogliptin since April 2022.

## 2022-05-02 NOTE — ASSESSMENT
[FreeTextEntry1] : 57 y/o woman with hyperparathyroidism, carpal tunnel syndrome, breast cancer s/p lumpectomy, mult. lung nodules, cervical dysplasia s/p total hysterectomy in 2012 here for JACQUES without fibrosis.\par FHx: alcoholic cirrhosis in father and uncle.\par \par # JACQUES without fibrosis\par US 10/2020: fatty liver\par Fibroscan 1/25/21: stage 0-1 fibrosis, stage 3 steatosis. Pt. has never had a liver biopsy (but one mentioned in old notes)\par Intermittent AST/ALT and ALP elevation. Serologic workup negative for other cause.\par Of recently, liver tests have been normal, weight steady, fibroscan suggests no scarring.\par No urgent need for aggressive intervention and also could not tolerate ozempic at doses higher than 0.5 mg sc weekly and at that dose, she did not have weight loss either. \par Continue alogliptin 25 mg daily\par Fibroscan q 1 year\par \par # RHM\par - Immune to HAV, Immune to HBV\par - [facility]: [facility]par \par I have advised the patient on maintaining a healthy lifestyle which includes keeping a healthy diet (Mediterranean diet is preferred), calorie reduction of 30%, and regular exercise of at least 150 minutes a week to improve Her fatty liver disease. I have also advised Ms. MAYS on avoidance of hepatotoxic agents and alcohol.\par \par Labs today\par RTC 6 months w/ Fibroscan\par \par

## 2022-05-03 LAB
ALBUMIN SERPL ELPH-MCNC: 4.4 G/DL
ALP BLD-CCNC: 139 U/L
ALT SERPL-CCNC: 44 U/L
ANION GAP SERPL CALC-SCNC: 12 MMOL/L
AST SERPL-CCNC: 30 U/L
BASOPHILS # BLD AUTO: 0.03 K/UL
BASOPHILS NFR BLD AUTO: 0.3 %
BILIRUB SERPL-MCNC: 0.3 MG/DL
BUN SERPL-MCNC: 10 MG/DL
CALCIUM SERPL-MCNC: 9.9 MG/DL
CHLORIDE SERPL-SCNC: 103 MMOL/L
CO2 SERPL-SCNC: 25 MMOL/L
CREAT SERPL-MCNC: 0.78 MG/DL
EGFR: 90 ML/MIN/1.73M2
EOSINOPHIL # BLD AUTO: 0.29 K/UL
EOSINOPHIL NFR BLD AUTO: 3.2 %
ESTIMATED AVERAGE GLUCOSE: 131 MG/DL
GGT SERPL-CCNC: 18 U/L
GLUCOSE SERPL-MCNC: 106 MG/DL
HBA1C MFR BLD HPLC: 6.2 %
HCT VFR BLD CALC: 42.1 %
HGB BLD-MCNC: 13 G/DL
IMM GRANULOCYTES NFR BLD AUTO: 0.7 %
INR PPP: 1.16 RATIO
LYMPHOCYTES # BLD AUTO: 3.07 K/UL
LYMPHOCYTES NFR BLD AUTO: 34.3 %
MAN DIFF?: NORMAL
MCHC RBC-ENTMCNC: 27.3 PG
MCHC RBC-ENTMCNC: 30.9 GM/DL
MCV RBC AUTO: 88.3 FL
MONOCYTES # BLD AUTO: 0.56 K/UL
MONOCYTES NFR BLD AUTO: 6.3 %
NEUTROPHILS # BLD AUTO: 4.93 K/UL
NEUTROPHILS NFR BLD AUTO: 55.2 %
PLATELET # BLD AUTO: 263 K/UL
POTASSIUM SERPL-SCNC: 4.1 MMOL/L
PROT SERPL-MCNC: 7.1 G/DL
PT BLD: 13.6 SEC
RBC # BLD: 4.77 M/UL
RBC # FLD: 13.2 %
SODIUM SERPL-SCNC: 140 MMOL/L
WBC # FLD AUTO: 8.94 K/UL

## 2022-05-19 ENCOUNTER — APPOINTMENT (OUTPATIENT)
Dept: GYNECOLOGIC ONCOLOGY | Facility: CLINIC | Age: 57
End: 2022-05-19
Payer: COMMERCIAL

## 2022-05-19 ENCOUNTER — NON-APPOINTMENT (OUTPATIENT)
Age: 57
End: 2022-05-19

## 2022-05-19 VITALS
DIASTOLIC BLOOD PRESSURE: 83 MMHG | HEART RATE: 82 BPM | BODY MASS INDEX: 34.22 KG/M2 | SYSTOLIC BLOOD PRESSURE: 127 MMHG | WEIGHT: 198 LBS | HEIGHT: 63.75 IN

## 2022-05-19 PROCEDURE — 57420 EXAM OF VAGINA W/SCOPE: CPT

## 2022-05-19 PROCEDURE — 99213 OFFICE O/P EST LOW 20 MIN: CPT | Mod: 25

## 2022-05-19 RX ORDER — MULTIVIT-MIN/FA/LYCOPEN/LUTEIN .4-300-25
TABLET ORAL
Refills: 0 | Status: ACTIVE | COMMUNITY

## 2022-05-24 LAB
CYTOLOGY CVX/VAG DOC THIN PREP: ABNORMAL
HPV HIGH+LOW RISK DNA PNL CVX: NOT DETECTED

## 2022-05-25 ENCOUNTER — NON-APPOINTMENT (OUTPATIENT)
Age: 57
End: 2022-05-25

## 2022-05-26 ENCOUNTER — APPOINTMENT (OUTPATIENT)
Dept: RHEUMATOLOGY | Facility: CLINIC | Age: 57
End: 2022-05-26
Payer: COMMERCIAL

## 2022-05-26 ENCOUNTER — NON-APPOINTMENT (OUTPATIENT)
Age: 57
End: 2022-05-26

## 2022-05-26 VITALS
HEIGHT: 63.75 IN | HEART RATE: 91 BPM | OXYGEN SATURATION: 98 % | WEIGHT: 199 LBS | DIASTOLIC BLOOD PRESSURE: 78 MMHG | BODY MASS INDEX: 34.39 KG/M2 | SYSTOLIC BLOOD PRESSURE: 135 MMHG | TEMPERATURE: 97 F

## 2022-05-26 DIAGNOSIS — M54.50 LOW BACK PAIN, UNSPECIFIED: ICD-10-CM

## 2022-05-26 PROCEDURE — 99204 OFFICE O/P NEW MOD 45 MIN: CPT

## 2022-05-27 DIAGNOSIS — M25.551 PAIN IN RIGHT HIP: ICD-10-CM

## 2022-05-27 DIAGNOSIS — M25.552 PAIN IN RIGHT HIP: ICD-10-CM

## 2022-06-20 ENCOUNTER — RESULT REVIEW (OUTPATIENT)
Age: 57
End: 2022-06-20

## 2022-06-26 ENCOUNTER — APPOINTMENT (OUTPATIENT)
Dept: MRI IMAGING | Facility: CLINIC | Age: 57
End: 2022-06-26

## 2022-06-26 ENCOUNTER — OUTPATIENT (OUTPATIENT)
Dept: OUTPATIENT SERVICES | Facility: HOSPITAL | Age: 57
LOS: 1 days | End: 2022-06-26
Payer: COMMERCIAL

## 2022-06-26 DIAGNOSIS — Z00.8 ENCOUNTER FOR OTHER GENERAL EXAMINATION: ICD-10-CM

## 2022-06-26 DIAGNOSIS — M25.551 PAIN IN RIGHT HIP: ICD-10-CM

## 2022-06-26 DIAGNOSIS — Z90.710 ACQUIRED ABSENCE OF BOTH CERVIX AND UTERUS: Chronic | ICD-10-CM

## 2022-06-26 PROCEDURE — 72195 MRI PELVIS W/O DYE: CPT | Mod: 26

## 2022-06-26 PROCEDURE — 72195 MRI PELVIS W/O DYE: CPT

## 2022-08-29 ENCOUNTER — APPOINTMENT (OUTPATIENT)
Dept: OTHER | Facility: CLINIC | Age: 57
End: 2022-08-29

## 2022-08-29 VITALS
SYSTOLIC BLOOD PRESSURE: 106 MMHG | HEIGHT: 63.75 IN | BODY MASS INDEX: 35.26 KG/M2 | OXYGEN SATURATION: 99 % | DIASTOLIC BLOOD PRESSURE: 72 MMHG | TEMPERATURE: 98.4 F | HEART RATE: 78 BPM | WEIGHT: 204 LBS

## 2022-08-29 DIAGNOSIS — Z12.9 ENCOUNTER FOR SCREENING FOR MALIGNANT NEOPLASM, SITE UNSPECIFIED: ICD-10-CM

## 2022-08-29 DIAGNOSIS — Z23 ENCOUNTER FOR IMMUNIZATION: ICD-10-CM

## 2022-08-29 PROCEDURE — 99214 OFFICE O/P EST MOD 30 MIN: CPT | Mod: 25

## 2022-08-29 PROCEDURE — 99396 PREV VISIT EST AGE 40-64: CPT | Mod: 25

## 2022-08-29 PROCEDURE — 94010 BREATHING CAPACITY TEST: CPT

## 2022-08-29 PROCEDURE — 90686 IIV4 VACC NO PRSV 0.5 ML IM: CPT

## 2022-08-29 PROCEDURE — G0008: CPT

## 2022-08-29 RX ORDER — TIOTROPIUM BROMIDE 18 UG/1
CAPSULE ORAL; RESPIRATORY (INHALATION)
Refills: 0 | Status: COMPLETED | COMMUNITY
End: 2022-08-29

## 2022-08-31 ENCOUNTER — TRANSCRIPTION ENCOUNTER (OUTPATIENT)
Age: 57
End: 2022-08-31

## 2022-08-31 LAB
ALBUMIN SERPL ELPH-MCNC: 4.3 G/DL
ALP BLD-CCNC: 151 U/L
ALT SERPL-CCNC: 60 U/L
ANION GAP SERPL CALC-SCNC: 10 MMOL/L
APPEARANCE: CLEAR
AST SERPL-CCNC: 31 U/L
BACTERIA: NEGATIVE
BASOPHILS # BLD AUTO: 0.02 K/UL
BASOPHILS NFR BLD AUTO: 0.3 %
BILIRUB SERPL-MCNC: 0.2 MG/DL
BILIRUBIN URINE: NEGATIVE
BLOOD URINE: NEGATIVE
BUN SERPL-MCNC: 9 MG/DL
CALCIUM SERPL-MCNC: 9.8 MG/DL
CHLORIDE SERPL-SCNC: 105 MMOL/L
CHOLEST SERPL-MCNC: 229 MG/DL
CO2 SERPL-SCNC: 25 MMOL/L
COLOR: YELLOW
CREAT SERPL-MCNC: 0.76 MG/DL
EGFR: 92 ML/MIN/1.73M2
EOSINOPHIL # BLD AUTO: 0.15 K/UL
EOSINOPHIL NFR BLD AUTO: 2.6 %
GLUCOSE QUALITATIVE U: NEGATIVE
GLUCOSE SERPL-MCNC: 102 MG/DL
HCT VFR BLD CALC: 41.1 %
HDLC SERPL-MCNC: 35 MG/DL
HGB BLD-MCNC: 12.7 G/DL
HYALINE CASTS: 4 /LPF
IMM GRANULOCYTES NFR BLD AUTO: 0.2 %
KETONES URINE: NEGATIVE
LDLC SERPL CALC-MCNC: 123 MG/DL
LEUKOCYTE ESTERASE URINE: NEGATIVE
LYMPHOCYTES # BLD AUTO: 2.04 K/UL
LYMPHOCYTES NFR BLD AUTO: 35.1 %
MAN DIFF?: NORMAL
MCHC RBC-ENTMCNC: 27.6 PG
MCHC RBC-ENTMCNC: 30.9 GM/DL
MCV RBC AUTO: 89.3 FL
MICROSCOPIC-UA: NORMAL
MONOCYTES # BLD AUTO: 0.42 K/UL
MONOCYTES NFR BLD AUTO: 7.2 %
NEUTROPHILS # BLD AUTO: 3.18 K/UL
NEUTROPHILS NFR BLD AUTO: 54.6 %
NITRITE URINE: NEGATIVE
NONHDLC SERPL-MCNC: 194 MG/DL
PH URINE: 6
PLATELET # BLD AUTO: 246 K/UL
POTASSIUM SERPL-SCNC: 4.3 MMOL/L
PROT SERPL-MCNC: 6.9 G/DL
PROTEIN URINE: NORMAL
RBC # BLD: 4.6 M/UL
RBC # FLD: 13.3 %
RED BLOOD CELLS URINE: 5 /HPF
SODIUM SERPL-SCNC: 139 MMOL/L
SPECIFIC GRAVITY URINE: 1.03
SQUAMOUS EPITHELIAL CELLS: 3 /HPF
TRIGL SERPL-MCNC: 352 MG/DL
UROBILINOGEN URINE: NORMAL
WBC # FLD AUTO: 5.82 K/UL
WHITE BLOOD CELLS URINE: 1 /HPF

## 2022-09-20 ENCOUNTER — APPOINTMENT (OUTPATIENT)
Dept: GASTROENTEROLOGY | Facility: CLINIC | Age: 57
End: 2022-09-20

## 2022-09-20 ENCOUNTER — NON-APPOINTMENT (OUTPATIENT)
Age: 57
End: 2022-09-20

## 2022-09-20 VITALS
HEART RATE: 78 BPM | DIASTOLIC BLOOD PRESSURE: 70 MMHG | WEIGHT: 204 LBS | BODY MASS INDEX: 34.83 KG/M2 | HEIGHT: 64 IN | SYSTOLIC BLOOD PRESSURE: 125 MMHG | OXYGEN SATURATION: 99 % | TEMPERATURE: 98.8 F

## 2022-09-20 DIAGNOSIS — R19.5 OTHER FECAL ABNORMALITIES: ICD-10-CM

## 2022-09-20 DIAGNOSIS — K62.5 HEMORRHAGE OF ANUS AND RECTUM: ICD-10-CM

## 2022-09-20 PROCEDURE — 99204 OFFICE O/P NEW MOD 45 MIN: CPT

## 2022-09-20 NOTE — HISTORY OF PRESENT ILLNESS
[FreeTextEntry1] : 56 years old with history of IBS (diagnosed 4-5 years ago), HLD, world trade exposure (2001) who presents with blood in stool. \par \par She had a MRI for pelvic pain (6 month history) in 9/2022, which showed sigmoid diverticulosis. Patient reports that two months ago, she reports that she saw bright red blood in the stool. She reports stool was hard ("little marbles") and black. Patient has not had bright red blood since then or prior to that episode. Most recently having normal brown bowel movements. Deneis abd pain, n/v, dysphagia, odynophagia, weight loss. \par \par She reports her IBS symptoms are constipation vs diarrhea. She will have constipation for several days and then diarrhea. \par \par She is going for PT for pelvic pain and back pain. \par \par Relevant medications include: famotidine 20mg BID, peptobismol when she has a lot of diarrhea (two months ago). Denies iron supplement use. Takes meloxicam for back/pelvic up to 3x a week, but restricts it due to heartburn. \par \par Her [facility] both [facility] normal. No reports available. \par FH: positive for stomach cancer in great grandfather.

## 2022-09-20 NOTE — PHYSICAL EXAM
[Alert] : alert [Normal Voice/Communication] : normal voice/communication [Well Developed] : well developed [Well Nourished] : well nourished [No Respiratory Distress] : no respiratory distress [No Acc Muscle Use] : no accessory muscle use [Heart Rate And Rhythm] : heart rate was normal and rhythm regular [Normal S1, S2] : normal S1 and S2 [Normal] : normal bowel sounds, non-tender, no masses, soft, no no hepato-splenomegaly [Abdomen Tenderness] : non-tender [No Masses] : no abdominal mass palpated [Normal Sphincter Tone] : normal sphincter tone [Chaperone Present: ____] : chaperone present: [unfilled] [Abnormal Walk] : normal gait [No Clubbing, Cyanosis] : no clubbing or cyanosis of the fingernails [No Joint Swelling] : no joint swelling seen [Normal Color / Pigmentation] : normal skin color and pigmentation [Oriented To Time, Place, And Person] : oriented to person, place, and time [Normal Affect] : the affect was normal [Normal Mood] : the mood was normal [Normal Turgor] : normal skin turgor [No Focal Deficits] : no focal deficits [de-identified] : lap scar in mid-abdomen [de-identified] : external hemorrhoids

## 2022-09-20 NOTE — END OF VISIT
[FreeTextEntry3] : 56F with intermittent episodes of brbpr and ?dark stools and heartburn. \par + nsaids and + pepto bismol use. Currently having nonbloody brown stool. Hgb 12.7. \par No blood or melena on rectal exam done by GI fellow as above. \par Dark stools may have been 2/2 pepto bismol use. \par \par Will proceed w/ EGD and Colonoscopy for intraluminal evaluation. R/B/A reviewed in detail. \par Prep sent to pharmacy on file. \par avoid nsaids \par continue H2 blocker \par RTC after egd/colon \par

## 2022-09-20 NOTE — ASSESSMENT
[FreeTextEntry1] : 56 years old with history of IBS (diagnosed 4-5 years ago), HLD, world trade exposure (2001) who presents with blood in stool (2 months prior). \par \par #BRBPR \par #dark stools \par Patient with one episode of bright red blood per rectum. +episodes of dark stool--  ?possibly around time of pepto bismol administration. Most recently having normal brown bowel movements. Rectal exam w/ hemorrhoids, no blood or dark stool noted. Hgb stable at 12.7. Given black stools as well as BRBPR, will need EGD/colo.\par \par #heartburn \par Intermittent heartburn in setting of nsaid use\par EGD in 2016 w gastritis but no report available. \par \par Plan: \par - limit/avoid meloxicam, patient to discuss pain regimen for fibormyalgia with her PCP \par - continue with pepcid BID, patient does not want to start PPI due to hx of osteopenia. R/B discussed \par - EGD and colonoscopy; discussed with patient risks of intervention including bleeding, infection, and perforation \par - prep sent to pharmacy on file \par \par Venecia Cedeno MD, PGY4 \par Gastroenterology Fellow

## 2022-10-04 LAB — SARS-COV-2 N GENE NPH QL NAA+PROBE: NOT DETECTED

## 2022-10-06 ENCOUNTER — APPOINTMENT (OUTPATIENT)
Dept: GASTROENTEROLOGY | Facility: AMBULATORY MEDICAL SERVICES | Age: 57
End: 2022-10-06

## 2022-10-06 PROCEDURE — 45380 COLONOSCOPY AND BIOPSY: CPT

## 2022-10-06 PROCEDURE — 43235 EGD DIAGNOSTIC BRUSH WASH: CPT

## 2022-11-08 ENCOUNTER — APPOINTMENT (OUTPATIENT)
Dept: HEPATOLOGY | Facility: CLINIC | Age: 57
End: 2022-11-08

## 2022-11-17 ENCOUNTER — APPOINTMENT (OUTPATIENT)
Dept: GYNECOLOGIC ONCOLOGY | Facility: CLINIC | Age: 57
End: 2022-11-17
Payer: COMMERCIAL

## 2022-11-17 VITALS
BODY MASS INDEX: 34.83 KG/M2 | DIASTOLIC BLOOD PRESSURE: 86 MMHG | SYSTOLIC BLOOD PRESSURE: 126 MMHG | HEART RATE: 87 BPM | WEIGHT: 204 LBS | HEIGHT: 64 IN

## 2022-11-17 DIAGNOSIS — R87.620 ATYPICAL SQUAMOUS CELLS OF UNDETERMINED SIGNIFICANCE ON CYTOLOGIC SMEAR OF VAGINA (ASC-US): ICD-10-CM

## 2022-11-17 PROCEDURE — 99213 OFFICE O/P EST LOW 20 MIN: CPT | Mod: 25

## 2022-11-17 PROCEDURE — 57420 EXAM OF VAGINA W/SCOPE: CPT

## 2022-11-17 RX ORDER — DEXTRAN 70, GLYCERIN, HYPROMELLOSE 1; 2; 3 MG/ML; MG/ML; MG/ML
0.1-0.2-0.3 SOLUTION/ DROPS OPHTHALMIC
Qty: 15 | Refills: 0 | Status: ACTIVE | COMMUNITY
Start: 2022-09-26

## 2022-11-17 RX ORDER — SOD CHLOR,BICARB/SQUEEZ BOTTLE
PACKET, WITH RINSE DEVICE NASAL
Qty: 1 | Refills: 0 | Status: ACTIVE | COMMUNITY
Start: 2022-10-11

## 2022-11-17 NOTE — PROCEDURE
[Colposcopy] : colposcopy [Abnormal Pap] : an abnormal pap smear [Patient] : the patient [Verbal Consent] : verbal consent was obtained prior to the procedure and is detailed in the patient's record [Site Verification] : site verification performed. [Time Out] : Time Out Procedure:The following was confirmed prior to the procedure-Correct patient identity, correct site, agreement on the procedure to be done and correct patient position. [No Abnormalities] : no abnormalities seen [No Complications] : none [Tolerated Well] : the patient tolerated the procedure well [Post procedure instructions and information given] : post procedure instructions and information given and reviewed with patient. [0] : 0

## 2022-11-30 LAB
CYTOLOGY CVX/VAG DOC THIN PREP: ABNORMAL
HPV HIGH+LOW RISK DNA PNL CVX: NOT DETECTED

## 2022-11-30 NOTE — PHYSICAL EXAM
[Absent] : Adnexa(ae): Absent [Normal] : Anus and perineum: Normal sphincter tone, no masses, no prolapse. [Chaperone Present] : A chaperone was present in the examining room during all aspects of the physical examination [de-identified] : healed LSC scars [de-identified] : cuff mobile, nontender

## 2022-11-30 NOTE — LETTER BODY
[Dear  ___] : Dear  [unfilled], [I recently saw our patient [unfilled] for a follow-up visit.] : I recently saw our patient, [unfilled] for a follow-up visit. [Attached please find my note.] : Attached please find my note. [FreeTextEntry2] : Her exam is benign and she will follow up in 6 months.  [FreeTextEntry1] : pap/hpv

## 2022-11-30 NOTE — HISTORY OF PRESENT ILLNESS
[FreeTextEntry1] : Ms. Danielle is a postmenopausal 56 year old  female, referred for abnormal vaginal pap smears. She had a long standing history of cervical dysplasia.  She underwent an LAVH for cervical dysplasia (small fragment HSIL on ECC) in  with Dr. Thomas.  At that time no residual cervical dysplasia was noted on final pathology.   Postoperatively she developed a pelvic abscess and sepsis requiring hospitalization.  Abscess was drained, and she was treated with IV antibiotics.    \par \par From that point [facility]  She notes annual normal pap smears with (+) HRHPV.  3/9/20 PAP LSIL/(-)HRHPV. 19 PAP LSIL/(-)HRHPV; 3/4/29 PAP LSIL/(-).\par She went a second opinion with Dr. Owens.  A repeat pap smear was performed which was ASCUS.  No colposcopy was performed, per patient. Per [facility] didn[facility]t identify the ovaries but no abnormality was seen.\par \par 2020- Pap- ASCUS ; (-)HRHPV\par 2020 INitial GYN ONC consult: [facility] on vaginal biopsies\par 2022 PAP ASCUS/HRHPV (-)\par \par PMH: NAFLD, IBS, multiple lung nodules, SHILPA, cervical dysplasia, DM, GERD, hyperparathyroidism, low grade urothelial carcinoma\par PSH: LSC Cholecystectomy, TVH, lumpectomy \par \par She is followed by [facility] for low grade urothelial carcinoma.\par \par She has dyspareunia since surgery and hasn't been sexually active for years.  \par She denies vaginal bleeding and vaginal discharge. She denies any other associated signs or symptoms.  \par She is here today for colposcopy.\par \par HM:\par Pap- see above\par Mammo- 10/2020- BIRADS-2; utd per pt\par Colonoscopy-  - polyps; 5y f/u recommended (in allscripts)\par \par Self-referred for a second opinion\par GYN- Dr. Blackwell\par PCP- Dr. Perez\par GI- Dr. Lin\par Endocrinologist- Dr. Weaver\par Pulmonologist- Dr. Vela

## 2022-12-01 ENCOUNTER — NON-APPOINTMENT (OUTPATIENT)
Age: 57
End: 2022-12-01

## 2023-01-25 ENCOUNTER — APPOINTMENT (OUTPATIENT)
Dept: GASTROENTEROLOGY | Facility: CLINIC | Age: 58
End: 2023-01-25
Payer: COMMERCIAL

## 2023-01-25 VITALS
HEART RATE: 89 BPM | HEIGHT: 64 IN | BODY MASS INDEX: 34.66 KG/M2 | OXYGEN SATURATION: 99 % | WEIGHT: 203 LBS | SYSTOLIC BLOOD PRESSURE: 120 MMHG | TEMPERATURE: 98.8 F | DIASTOLIC BLOOD PRESSURE: 82 MMHG

## 2023-01-25 DIAGNOSIS — R19.8 OTHER SPECIFIED SYMPTOMS AND SIGNS INVOLVING THE DIGESTIVE SYSTEM AND ABDOMEN: ICD-10-CM

## 2023-01-25 DIAGNOSIS — K64.8 OTHER HEMORRHOIDS: ICD-10-CM

## 2023-01-25 PROCEDURE — 99214 OFFICE O/P EST MOD 30 MIN: CPT

## 2023-01-25 RX ORDER — SODIUM SULFATE, POTASSIUM SULFATE AND MAGNESIUM SULFATE 1.6; 3.13; 17.5 G/177ML; G/177ML; G/177ML
17.5-3.13-1.6 SOLUTION ORAL
Qty: 1 | Refills: 0 | Status: DISCONTINUED | COMMUNITY
Start: 2022-09-20 | End: 2023-01-25

## 2023-01-25 NOTE — HISTORY OF PRESENT ILLNESS
[FreeTextEntry1] : Here for follow up visit. \par [facility] but now wants to transition care fully to [facility]. \par Was restarted on omeprazole 40mg po qday for heartburn w near complete resolution of heartburn, reflux, bloating. \par \par Primarily has constipation, intermittent diarrhea. \par Some rectal pain from hemorrhoids when straining with bowel movements. \par Denies hematochezia, melena, abd pain, n/v, weight loss. \par

## 2023-01-25 NOTE — ASSESSMENT
[FreeTextEntry1] : 56F, WTC exposure, bladder ca, rhinitis, HLD, SHILPA, IBS (dx 5 yrs ago w prior GI), diverticulosis, kamar liver disease seeing hepatology, +fhx of gastric cancer (great grandfather), seen initially in office in Sept 2022 for multiple GI complaints, here now for follow up visit. \par \par \par # GERD \par # Bloating \par - EGD 10/2022 - erythema in stomach, normal esophagus and duodenum. Path - minimal chronic inflammation, no h pylori.\par - symptoms significant improved after restarting Omeprazole 40mg po qday by her prior GI one month ago. \par - Continue for now, refill sent\par - if continues to feel well at next visit, will de-escalate to omeprazole 20mg po qday \par \par # IBS \par Alternating constipation and diarrhea, primarily constipation now \par Diagnosed ~[facility]yrs [facility]. \par s/p colonoscopy as below \par - start daily fiber supplement\par - trial of saima tea, peppermint tea \par - if no improvement will consider trial of fodmap diet \par \par # Hemorrhoids \par - fiber supplement as above\par - prn prepration H, calmol 4 suppositories, sitz baths\par - if no improvement will refer to colorectal surgeyr \par \par # Colon ca screening \par Colonoscopy 10/2022 - (done w/o sedation due to coughing/wheezing/nasal secretions) - 2mm descending colon polyp, diverticulosis, internal hemorhroids. Path - TA \par - repeat colonoscopy in 5 yrs for screening purposes \par \par RTC 2 mos\par

## 2023-02-23 ENCOUNTER — RX ONLY (RX ONLY)
Age: 58
End: 2023-02-23

## 2023-02-23 ENCOUNTER — OFFICE (OUTPATIENT)
Dept: URBAN - METROPOLITAN AREA CLINIC 35 | Facility: CLINIC | Age: 58
Setting detail: OPHTHALMOLOGY
End: 2023-02-23
Payer: COMMERCIAL

## 2023-02-23 DIAGNOSIS — H18.413: ICD-10-CM

## 2023-02-23 DIAGNOSIS — H02.34: ICD-10-CM

## 2023-02-23 DIAGNOSIS — H52.7: ICD-10-CM

## 2023-02-23 DIAGNOSIS — H25.13: ICD-10-CM

## 2023-02-23 DIAGNOSIS — H52.4: ICD-10-CM

## 2023-02-23 DIAGNOSIS — H02.31: ICD-10-CM

## 2023-02-23 PROCEDURE — 99213 OFFICE O/P EST LOW 20 MIN: CPT | Performed by: OPHTHALMOLOGY

## 2023-02-23 PROCEDURE — 92015 DETERMINE REFRACTIVE STATE: CPT | Performed by: OPHTHALMOLOGY

## 2023-02-23 ASSESSMENT — REFRACTION_CURRENTRX
OS_VPRISM_DIRECTION: SV
OD_SPHERE: +2.25
OD_AXIS: 012
OD_AXIS: 015
OS_OVR_VA: 20/
OS_CYLINDER: +0.75
OS_SPHERE: +1.00
OS_SPHERE: -0.50
OS_OVR_VA: 20/
OD_SPHERE: +1.25
OD_VPRISM_DIRECTION: SV
OD_VPRISM_DIRECTION: SV
OS_AXIS: 001
OD_OVR_VA: 20/
OS_VPRISM_DIRECTION: SV
OS_CYLINDER: +1.00
OD_CYLINDER: +0.75
OS_AXIS: 009
OS_SPHERE: +1.50
OD_CYLINDER: +0.50
OS_AXIS: 010
OD_VPRISM_DIRECTION: SV
OS_OVR_VA: 20/
OD_OVR_VA: 20/
OD_OVR_VA: 20/
OS_CYLINDER: +0.75
OS_VPRISM_DIRECTION: SV
OD_SPHERE: -0.25

## 2023-02-23 ASSESSMENT — REFRACTION_AUTOREFRACTION
OS_SPHERE: -0.50
OS_CYLINDER: +0.75
OD_SPHERE: 0.00
OD_CYLINDER: +0.25
OS_AXIS: 008
OD_AXIS: 174

## 2023-02-23 ASSESSMENT — REFRACTION_MANIFEST
OS_VA1: 20/20
OS_AXIS: 010
OS_VA1: 20/20
OD_SPHERE: -0.25
OD_AXIS: 010
OS_ADD: +2.75
OS_ADD: +3.00
OS_SPHERE: -0.50
OS_SPHERE: -0.50
OD_VA1: 20/25
OD_VA1: 20/20
OS_CYLINDER: +1.00
OD_ADD: +3.00
OS_CYLINDER: +0.50
OD_ADD: +2.75
OS_AXIS: 180
OD_CYLINDER: +0.50
OD_CYLINDER: SPHERE
OD_SPHERE: -0.50

## 2023-02-23 ASSESSMENT — KERATOMETRY
OS_AXISANGLE_DEGREES: 098
OD_AXISANGLE_DEGREES: 082
OD_K2POWER_DIOPTERS: 43.25
OD_K1POWER_DIOPTERS: 42.50
OS_K1POWER_DIOPTERS: 42.50
OS_K2POWER_DIOPTERS: 43.25

## 2023-02-23 ASSESSMENT — CONFRONTATIONAL VISUAL FIELD TEST (CVF)
OD_FINDINGS: FULL
OS_FINDINGS: FULL

## 2023-02-23 ASSESSMENT — AXIALLENGTH_DERIVED
OD_AL: 23.8237
OD_AL: 23.7742
OS_AL: 23.9235
OS_AL: 23.8237
OS_AL: 23.8735

## 2023-02-23 ASSESSMENT — SPHEQUIV_DERIVED
OD_SPHEQUIV: 0.125
OS_SPHEQUIV: -0.125
OD_SPHEQUIV: 0
OS_SPHEQUIV: 0
OS_SPHEQUIV: -0.25

## 2023-02-23 ASSESSMENT — LID POSITION - COMMENTS
OS_COMMENTS: BLEPHAROCHALASIS
OD_COMMENTS: BLEPHAROCHALASIS

## 2023-02-23 ASSESSMENT — VISUAL ACUITY
OS_BCVA: 20/30
OD_BCVA: 20/30-1

## 2023-03-22 ENCOUNTER — APPOINTMENT (OUTPATIENT)
Dept: GASTROENTEROLOGY | Facility: CLINIC | Age: 58
End: 2023-03-22
Payer: COMMERCIAL

## 2023-03-22 VITALS
BODY MASS INDEX: 33.97 KG/M2 | TEMPERATURE: 97.6 F | HEART RATE: 88 BPM | HEIGHT: 64 IN | WEIGHT: 199 LBS | DIASTOLIC BLOOD PRESSURE: 78 MMHG | OXYGEN SATURATION: 97 % | SYSTOLIC BLOOD PRESSURE: 123 MMHG

## 2023-03-22 DIAGNOSIS — R11.0 NAUSEA: ICD-10-CM

## 2023-03-22 DIAGNOSIS — R14.0 ABDOMINAL DISTENSION (GASEOUS): ICD-10-CM

## 2023-03-22 DIAGNOSIS — K63.5 POLYP OF COLON: ICD-10-CM

## 2023-03-22 PROCEDURE — 99213 OFFICE O/P EST LOW 20 MIN: CPT

## 2023-03-28 PROBLEM — K63.5 COLON POLYP: Status: ACTIVE | Noted: 2023-01-25

## 2023-03-28 NOTE — HISTORY OF PRESENT ILLNESS
[FreeTextEntry1] : Here for follow up visit. \par Last seen Jan 2023. \par \par Today is reporting significant bloating after each meal. \par Feels that food is sitting in her stomach. \par + nausea \par + feels full quickly \par + occasional epigastric abdominal pain\par She has been compliant with PPI. \par No nsaid use \par \par Bowel movements are usually soft. \par Anywhere from once a day to 3-4 x per day. \par Taking fiber supplement, finds it helpful. \par \par Denies hematochezia, melena, vomiting, dysphagia, odynophagia, weight loss. \par \par \par \par

## 2023-03-28 NOTE — ASSESSMENT
[FreeTextEntry1] : 57F, WTC exposure, bladder ca, rhinitis, HLD, SHILPA, IBS (dx 5 yrs ago w prior GI), diverticulosis, fatty liver disease seeing hepatology, +fhx of gastric cancer (great grandfather), seen initially in office in Sept 2022 for multiple GI complaints, here now for follow up visit. \par \par \par # GERD \par # Bloating \par # Nausea\par - EGD 10/2022 - erythema in stomach, normal esophagus and duodenum. Path - minimal chronic inflammation, no h pylori.\par - heartburn has improved on PPI , to continue\par - still has bloating/nausea. Will order GES to evaluate \par \par \par # IBS \par Alternating constipation and diarrhea, primarily normal to soft stool snow \par Diagnosed ~[facility]yrs [facility]. \par s/p colonoscopy as below \par - continue daily fiber supplement\par - trial of saima tea, peppermint tea \par - recommend trial of dairy free diet x 2 weeks followed by gluten free diet x 2 weeks\par - if no improvement, will discuss fodmap diet \par \par # Hemorrhoids \par - fiber supplement as above\par - prn prepration H, calmol 4 suppositories, sitz baths\par - if no improvement will refer to colorectal surgery\par \par # Colon ca screening \par Colonoscopy 10/2022 - (done w/o sedation due to coughing/wheezing/nasal secretions) - 2mm descending colon polyp, diverticulosis, internal hemorrhoids. Path - TA \par - repeat colonoscopy in 5 yrs for screening purposes \par \par RTC after imaging \par

## 2023-05-11 ENCOUNTER — OUTPATIENT (OUTPATIENT)
Dept: OUTPATIENT SERVICES | Facility: HOSPITAL | Age: 58
LOS: 1 days | End: 2023-05-11

## 2023-05-11 ENCOUNTER — APPOINTMENT (OUTPATIENT)
Dept: NUCLEAR MEDICINE | Facility: HOSPITAL | Age: 58
End: 2023-05-11

## 2023-05-11 DIAGNOSIS — Z90.710 ACQUIRED ABSENCE OF BOTH CERVIX AND UTERUS: Chronic | ICD-10-CM

## 2023-05-11 DIAGNOSIS — R11.0 NAUSEA: ICD-10-CM

## 2023-05-11 DIAGNOSIS — R14.0 ABDOMINAL DISTENSION (GASEOUS): ICD-10-CM

## 2023-05-15 ENCOUNTER — NON-APPOINTMENT (OUTPATIENT)
Age: 58
End: 2023-05-15

## 2023-05-18 ENCOUNTER — APPOINTMENT (OUTPATIENT)
Dept: GYNECOLOGIC ONCOLOGY | Facility: CLINIC | Age: 58
End: 2023-05-18
Payer: COMMERCIAL

## 2023-05-18 VITALS
BODY MASS INDEX: 31.92 KG/M2 | DIASTOLIC BLOOD PRESSURE: 83 MMHG | HEART RATE: 67 BPM | WEIGHT: 187 LBS | HEIGHT: 64 IN | SYSTOLIC BLOOD PRESSURE: 122 MMHG

## 2023-05-18 DIAGNOSIS — Z80.0 FAMILY HISTORY OF MALIGNANT NEOPLASM OF DIGESTIVE ORGANS: ICD-10-CM

## 2023-05-18 PROCEDURE — 57420 EXAM OF VAGINA W/SCOPE: CPT

## 2023-05-18 PROCEDURE — 99213 OFFICE O/P EST LOW 20 MIN: CPT | Mod: 25

## 2023-05-18 RX ORDER — DICLOFENAC SODIUM 1 %
1 KIT TOPICAL
Refills: 0 | Status: DISCONTINUED | COMMUNITY
End: 2023-05-18

## 2023-05-18 RX ORDER — ETHYL ALCOHOL 75 ML/100ML
GEL TOPICAL
Refills: 0 | Status: DISCONTINUED | COMMUNITY
End: 2023-05-18

## 2023-05-18 RX ORDER — MECLIZINE HYDROCHLORIDE 12.5 MG/1
12.5 TABLET ORAL
Refills: 0 | Status: DISCONTINUED | COMMUNITY
End: 2023-05-18

## 2023-05-18 RX ORDER — CROMOLYN SODIUM 5.2 MG
5.2 AEROSOL, SPRAY WITH PUMP (ML) NASAL
Refills: 0 | Status: DISCONTINUED | COMMUNITY
End: 2023-05-18

## 2023-05-18 RX ORDER — MULTIVIT-MIN/FOLIC/VIT K/LYCOP 400-300MCG
25 MCG TABLET ORAL
Qty: 200 | Refills: 0 | Status: DISCONTINUED | COMMUNITY
Start: 2022-06-13 | End: 2023-05-18

## 2023-05-18 RX ORDER — FLUTICASONE PROPIONATE AND SALMETEROL 250; 50 UG/1; UG/1
250-50 POWDER RESPIRATORY (INHALATION)
Refills: 0 | Status: DISCONTINUED | COMMUNITY
End: 2023-05-18

## 2023-05-18 RX ORDER — FLUOCINONIDE 0.5 MG/ML
0.05 SOLUTION TOPICAL
Qty: 60 | Refills: 0 | Status: DISCONTINUED | COMMUNITY
Start: 2018-12-06 | End: 2023-05-18

## 2023-05-18 RX ORDER — CAMPHOR AND MENTHOL 5; 5 MG/ML; MG/ML
0.5-0.5 LOTION TOPICAL
Qty: 222 | Refills: 0 | Status: DISCONTINUED | COMMUNITY
Start: 2018-12-06 | End: 2023-05-18

## 2023-05-18 RX ORDER — FAMOTIDINE 20 MG/1
20 TABLET, FILM COATED ORAL
Refills: 0 | Status: DISCONTINUED | COMMUNITY
End: 2023-05-18

## 2023-06-26 ENCOUNTER — NON-APPOINTMENT (OUTPATIENT)
Age: 58
End: 2023-06-26

## 2023-06-28 ENCOUNTER — NON-APPOINTMENT (OUTPATIENT)
Age: 58
End: 2023-06-28

## 2023-06-28 ENCOUNTER — OFFICE (OUTPATIENT)
Dept: URBAN - METROPOLITAN AREA CLINIC 35 | Facility: CLINIC | Age: 58
Setting detail: OPHTHALMOLOGY
End: 2023-06-28
Payer: COMMERCIAL

## 2023-06-28 DIAGNOSIS — E11.9: ICD-10-CM

## 2023-06-28 DIAGNOSIS — H18.413: ICD-10-CM

## 2023-06-28 DIAGNOSIS — H52.7: ICD-10-CM

## 2023-06-28 DIAGNOSIS — H25.13: ICD-10-CM

## 2023-06-28 DIAGNOSIS — H52.4: ICD-10-CM

## 2023-06-28 PROCEDURE — 92250 FUNDUS PHOTOGRAPHY W/I&R: CPT | Performed by: OPHTHALMOLOGY

## 2023-06-28 PROCEDURE — 92014 COMPRE OPH EXAM EST PT 1/>: CPT | Performed by: OPHTHALMOLOGY

## 2023-06-28 PROCEDURE — 92015 DETERMINE REFRACTIVE STATE: CPT | Performed by: OPHTHALMOLOGY

## 2023-06-28 ASSESSMENT — REFRACTION_CURRENTRX
OD_SPHERE: -0.25
OS_CYLINDER: +1.00
OS_CYLINDER: +1.00
OD_CYLINDER: SPHERE
OD_VPRISM_DIRECTION: SV
OD_SPHERE: -0.25
OS_OVR_VA: 20/
OS_SPHERE: -0.50
OS_OVR_VA: 20/
OS_CYLINDER: +0.50
OD_AXIS: 012
OS_VPRISM_DIRECTION: SV
OD_OVR_VA: 20/
OS_AXIS: 006
OD_VPRISM_DIRECTION: SV
OS_OVR_VA: 20/
OS_SPHERE: -0.50
OD_OVR_VA: 20/
OD_CYLINDER: +0.50
OS_AXIS: 005
OD_SPHERE: +1.75
OS_SPHERE: +1.75
OS_VPRISM_DIRECTION: SV
OD_VPRISM_DIRECTION: SV
OS_AXIS: 009
OD_OVR_VA: 20/
OS_VPRISM_DIRECTION: SV
OD_CYLINDER: +0.50
OD_AXIS: 007

## 2023-06-28 ASSESSMENT — REFRACTION_MANIFEST
OS_CYLINDER: SPHERE
OS_CYLINDER: +0.50
OS_SPHERE: -0.50
OD_AXIS: 010
OS_VA1: 20/20
OS_ADD: +2.75
OS_SPHERE: PLANO
OD_SPHERE: PLANO
OD_CYLINDER: SPHERE
OD_ADD: +3.00
OD_ADD: +2.75
OD_SPHERE: -0.50
OS_SPHERE: -0.50
OD_ADD: +2.50
OD_CYLINDER: +0.50
OS_VA1: 20/20
OS_ADD: +3.00
OD_VA1: 20/20
OS_AXIS: 180
OD_VA1: 20/25
OD_CYLINDER: SPHERE
OD_SPHERE: -0.25
OS_CYLINDER: +1.00
OS_ADD: +2.50
OS_AXIS: 010

## 2023-06-28 ASSESSMENT — SPHEQUIV_DERIVED
OS_SPHEQUIV: 0.25
OD_SPHEQUIV: 0
OD_SPHEQUIV: 0.125
OS_SPHEQUIV: -0.25
OS_SPHEQUIV: 0

## 2023-06-28 ASSESSMENT — AXIALLENGTH_DERIVED
OS_AL: 23.9235
OD_AL: 23.7742
OD_AL: 23.8237
OS_AL: 23.7248
OS_AL: 23.8237

## 2023-06-28 ASSESSMENT — VISUAL ACUITY
OD_BCVA: 20/25+2
OS_BCVA: 20/20

## 2023-06-28 ASSESSMENT — TONOMETRY
OD_IOP_MMHG: 15
OS_IOP_MMHG: 15

## 2023-06-28 ASSESSMENT — LID POSITION - COMMENTS
OS_COMMENTS: BLEPHAROCHALASIS
OD_COMMENTS: BLEPHAROCHALASIS

## 2023-06-28 ASSESSMENT — KERATOMETRY
OS_K2POWER_DIOPTERS: 43.25
OD_K1POWER_DIOPTERS: 42.50
OD_AXISANGLE_DEGREES: 082
OS_AXISANGLE_DEGREES: 098
OS_K1POWER_DIOPTERS: 42.50
OD_K2POWER_DIOPTERS: 43.25

## 2023-06-28 ASSESSMENT — REFRACTION_AUTOREFRACTION
OS_AXIS: 003
OS_CYLINDER: +0.50
OS_SPHERE: 0.00
OD_CYLINDER: +0.25
OD_SPHERE: 0.00
OD_AXIS: 010

## 2023-07-10 LAB
CYTOLOGY CVX/VAG DOC THIN PREP: NORMAL
HPV HIGH+LOW RISK DNA PNL CVX: NOT DETECTED

## 2023-07-10 NOTE — HISTORY OF PRESENT ILLNESS
[FreeTextEntry1] : Ms. Danielle is a  female, referred for abnormal vaginal pap smears. She had a long standing history of cervical dysplasia.  She underwent an LAVH for cervical dysplasia (small fragment HSIL on ECC) in  with Dr. Thomas.  At that time no residual cervical dysplasia was noted on final pathology.   Postoperatively she developed a pelvic abscess and sepsis requiring hospitalization.  Abscess was drained, and she was treated with IV antibiotics.    \par \par From that point she [facility]  She notes annual normal pap smears with (+) HRHPV.  3/9/20 PAP LSIL/(-)HRHPV. 19 PAP LSIL/(-)HRHPV; 3/4/29 PAP LSIL/(-).\par She went a second opinion with Dr. Owens.  A repeat pap smear was performed which was ASCUS.  No colposcopy was performed, per patient. Per patient [facility] didn[facility]t identify the ovaries but no abnormality was seen.\par \par 2020- Pap- ASCUS ; (-)HRHPV\par 2020 INitial GYN ONC consult: [facility] on vaginal biopsies\par 2022 PAP ASCUS/HRHPV (-)\par 2022 PAP LSIL, HRHPV (-)\par \par PMH: NAFLD, IBS, multiple lung nodules, SHILPA, cervical dysplasia, DM, GERD, hyperparathyroidism, low grade urothelial carcinoma\par PSH: LSC Cholecystectomy, TVH, lumpectomy \par \par She is followed by [facility] [place] grade urothelial carcinoma.\par \par She has dyspareunia since surgery and hasn't been sexually active for years.  \par She had a yeast infection after being on amoxicillin for a sinus infection in March, and then developed a yeast infection which she treated with Monistat one time in early April; however she still has residual itching and burning, but did not treat again. She denies any discharge. \par She denies vaginal bleeding. She denies any other associated signs or symptoms.  \par She is here today for colposcopy.\par \par HM:\par Pap- see above\par Mammo- 10/2020- BIRADS-2; utd per pt, a right breast density was seen on mammogram and she is planning a breast sono later today at [facility]. \par Colonoscopy-  - polyps; 5y f/u recommended (in allscripts)\par \par \par Self-referred for a second opinion\par GYN- Dr. Blackwell ([facility] doctor); [facility]. D[facility]az [facility]. \par PCP- Dr. Perez\par GI- Dr. Lin\par Endocrinologist- Dr. Weaver\par Pulmonologist- Dr. Vela

## 2023-07-10 NOTE — PHYSICAL EXAM
[Chaperone Present] : A chaperone was present in the examining room during all aspects of the physical examination [Absent] : Adnexa(ae): Absent [Normal] : Anus and perineum: Normal sphincter tone, no masses, no prolapse. [de-identified] : healed LSC scars [de-identified] : cuff mobile, nontender

## 2023-07-17 ENCOUNTER — OFFICE (OUTPATIENT)
Dept: URBAN - METROPOLITAN AREA CLINIC 35 | Facility: CLINIC | Age: 58
Setting detail: OPHTHALMOLOGY
End: 2023-07-17
Payer: COMMERCIAL

## 2023-07-17 DIAGNOSIS — L03.213: ICD-10-CM
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
## 2023-07-17 DIAGNOSIS — H00.14: ICD-10-CM

## 2023-07-17 PROCEDURE — 92012 INTRM OPH EXAM EST PATIENT: CPT | Performed by: OPHTHALMOLOGY

## 2023-07-17 ASSESSMENT — REFRACTION_MANIFEST
OD_ADD: +3.00
OS_AXIS: 010
OD_AXIS: 010
OS_CYLINDER: SPHERE
OS_VA1: 20/20
OD_VA1: 20/20
OS_CYLINDER: +1.00
OS_CYLINDER: +0.50
OD_SPHERE: -0.50
OS_ADD: +2.75
OD_CYLINDER: +0.50
OD_VA1: 20/25
OD_CYLINDER: SPHERE
OD_SPHERE: PLANO
OS_AXIS: 180
OS_SPHERE: -0.50
OD_ADD: +2.75
OD_CYLINDER: SPHERE
OS_ADD: +2.50
OS_ADD: +3.00
OS_VA1: 20/20
OS_SPHERE: PLANO
OD_ADD: +2.50
OS_SPHERE: -0.50
OD_SPHERE: -0.25

## 2023-07-17 ASSESSMENT — REFRACTION_CURRENTRX
OD_VPRISM_DIRECTION: SV
OD_OVR_VA: 20/
OS_SPHERE: -0.50
OD_CYLINDER: SPHERE
OS_SPHERE: -0.50
OD_AXIS: 007
OS_VPRISM_DIRECTION: SV
OD_VPRISM_DIRECTION: SV
OD_SPHERE: -0.25
OS_AXIS: 006
OD_OVR_VA: 20/
OD_CYLINDER: +0.50
OD_CYLINDER: +0.50
OD_SPHERE: +1.75
OS_VPRISM_DIRECTION: SV
OD_OVR_VA: 20/
OS_AXIS: 005
OS_CYLINDER: +0.50
OS_OVR_VA: 20/
OS_AXIS: 009
OD_SPHERE: -0.25
OS_SPHERE: +1.75
OS_CYLINDER: +1.00
OD_VPRISM_DIRECTION: SV
OS_OVR_VA: 20/
OS_VPRISM_DIRECTION: SV
OS_OVR_VA: 20/
OD_AXIS: 012
OS_CYLINDER: +1.00

## 2023-07-17 ASSESSMENT — LID POSITION - COMMENTS
OD_COMMENTS: BLEPHAROCHALASIS
OS_COMMENTS: BLEPHAROCHALASIS

## 2023-07-17 ASSESSMENT — AXIALLENGTH_DERIVED
OS_AL: 23.7827
OS_AL: 23.5378
OD_AL: 23.777
OS_AL: 23.6841
OD_AL: 23.6785

## 2023-07-17 ASSESSMENT — SPHEQUIV_DERIVED
OD_SPHEQUIV: 0
OS_SPHEQUIV: -0.25
OD_SPHEQUIV: 0.25
OS_SPHEQUIV: 0
OS_SPHEQUIV: 0.375

## 2023-07-17 ASSESSMENT — KERATOMETRY
OS_K1POWER_DIOPTERS: 42.50
OS_AXISANGLE_DEGREES: 104
OS_K2POWER_DIOPTERS: 44.00
OD_K2POWER_DIOPTERS: 43.50
OD_K1POWER_DIOPTERS: 42.50
OD_AXISANGLE_DEGREES: 096

## 2023-07-17 ASSESSMENT — REFRACTION_AUTOREFRACTION
OS_AXIS: 167
OD_SPHERE: 0.00
OD_CYLINDER: +0.50
OD_AXIS: 175
OS_CYLINDER: +0.75
OS_SPHERE: 0.00

## 2023-07-17 ASSESSMENT — VISUAL ACUITY
OD_BCVA: 20/30
OS_BCVA: 20/20-2

## 2023-07-25 ENCOUNTER — OFFICE (OUTPATIENT)
Dept: URBAN - METROPOLITAN AREA CLINIC 35 | Facility: CLINIC | Age: 58
Setting detail: OPHTHALMOLOGY
End: 2023-07-25
Payer: COMMERCIAL

## 2023-07-25 ENCOUNTER — RX ONLY (RX ONLY)
Age: 58
End: 2023-07-25

## 2023-07-25 DIAGNOSIS — H00.14: ICD-10-CM

## 2023-07-25 DIAGNOSIS — L03.213: ICD-10-CM

## 2023-07-25 PROCEDURE — 99213 OFFICE O/P EST LOW 20 MIN: CPT | Performed by: OPHTHALMOLOGY

## 2023-07-25 ASSESSMENT — REFRACTION_CURRENTRX
OS_CYLINDER: +0.50
OD_AXIS: 007
OS_CYLINDER: +1.00
OD_SPHERE: +1.75
OS_OVR_VA: 20/
OS_CYLINDER: +1.00
OD_OVR_VA: 20/
OS_AXIS: 006
OD_OVR_VA: 20/
OS_SPHERE: -0.50
OD_OVR_VA: 20/
OD_VPRISM_DIRECTION: SV
OS_SPHERE: -0.50
OD_CYLINDER: +0.50
OS_VPRISM_DIRECTION: SV
OD_CYLINDER: SPHERE
OS_AXIS: 005
OS_OVR_VA: 20/
OD_SPHERE: -0.25
OD_CYLINDER: +0.50
OD_VPRISM_DIRECTION: SV
OD_VPRISM_DIRECTION: SV
OD_SPHERE: -0.25
OS_AXIS: 009
OS_OVR_VA: 20/
OS_SPHERE: +1.75
OS_VPRISM_DIRECTION: SV
OD_AXIS: 012
OS_VPRISM_DIRECTION: SV

## 2023-07-25 ASSESSMENT — REFRACTION_AUTOREFRACTION
OS_SPHERE: 0.00
OD_AXIS: 175
OS_AXIS: 167
OD_CYLINDER: +0.50
OS_CYLINDER: +0.75
OD_SPHERE: 0.00

## 2023-07-25 ASSESSMENT — KERATOMETRY
OD_K1POWER_DIOPTERS: 42.50
OD_AXISANGLE_DEGREES: 096
OS_AXISANGLE_DEGREES: 104
OS_K2POWER_DIOPTERS: 44.00
OD_K2POWER_DIOPTERS: 43.50
OS_K1POWER_DIOPTERS: 42.50

## 2023-07-25 ASSESSMENT — AXIALLENGTH_DERIVED
OS_AL: 23.6841
OS_AL: 23.7827
OD_AL: 23.6785
OS_AL: 23.5378
OD_AL: 23.777

## 2023-07-25 ASSESSMENT — REFRACTION_MANIFEST
OS_AXIS: 180
OD_CYLINDER: +0.50
OD_ADD: +2.75
OS_VA1: 20/20
OD_ADD: +3.00
OD_VA1: 20/25
OS_SPHERE: -0.50
OD_AXIS: 010
OS_AXIS: 010
OD_CYLINDER: SPHERE
OD_SPHERE: -0.25
OD_CYLINDER: SPHERE
OS_SPHERE: PLANO
OS_CYLINDER: +1.00
OS_CYLINDER: SPHERE
OD_SPHERE: -0.50
OS_ADD: +3.00
OS_ADD: +2.75
OS_CYLINDER: +0.50
OD_VA1: 20/20
OD_SPHERE: PLANO
OS_ADD: +2.50
OS_VA1: 20/20
OS_SPHERE: -0.50
OD_ADD: +2.50

## 2023-07-25 ASSESSMENT — VISUAL ACUITY
OS_BCVA: 20/30
OD_BCVA: 20/25

## 2023-07-25 ASSESSMENT — SPHEQUIV_DERIVED
OS_SPHEQUIV: 0.375
OD_SPHEQUIV: 0
OD_SPHEQUIV: 0.25
OS_SPHEQUIV: 0
OS_SPHEQUIV: -0.25

## 2023-07-25 ASSESSMENT — CONFRONTATIONAL VISUAL FIELD TEST (CVF)
OD_FINDINGS: FULL
OS_FINDINGS: FULL

## 2023-07-25 ASSESSMENT — LID POSITION - COMMENTS
OD_COMMENTS: BLEPHAROCHALASIS
OS_COMMENTS: BLEPHAROCHALASIS

## 2023-09-01 ENCOUNTER — LABORATORY RESULT (OUTPATIENT)
Age: 58
End: 2023-09-01

## 2023-09-01 ENCOUNTER — APPOINTMENT (OUTPATIENT)
Dept: OTHER | Facility: CLINIC | Age: 58
End: 2023-09-01
Payer: COMMERCIAL

## 2023-09-01 VITALS
BODY MASS INDEX: 33.63 KG/M2 | HEART RATE: 79 BPM | OXYGEN SATURATION: 98 % | SYSTOLIC BLOOD PRESSURE: 117 MMHG | RESPIRATION RATE: 14 BRPM | TEMPERATURE: 97.8 F | WEIGHT: 197 LBS | DIASTOLIC BLOOD PRESSURE: 80 MMHG | HEIGHT: 64 IN

## 2023-09-01 DIAGNOSIS — K21.9 GASTRO-ESOPHAGEAL REFLUX DISEASE W/OUT ESOPHAGITIS: ICD-10-CM

## 2023-09-01 DIAGNOSIS — J31.0 CHRONIC RHINITIS: ICD-10-CM

## 2023-09-01 DIAGNOSIS — D41.4 NEOPLASM OF UNCERTAIN BEHAVIOR OF BLADDER: ICD-10-CM

## 2023-09-01 DIAGNOSIS — G47.33 OBSTRUCTIVE SLEEP APNEA (ADULT) (PEDIATRIC): ICD-10-CM

## 2023-09-01 DIAGNOSIS — Z04.9 ENCOUNTER FOR EXAMINATION AND OBSERVATION FOR UNSPECIFIED REASON: ICD-10-CM

## 2023-09-01 PROCEDURE — 90686 IIV4 VACC NO PRSV 0.5 ML IM: CPT

## 2023-09-01 PROCEDURE — G0008: CPT

## 2023-09-01 PROCEDURE — 99396 PREV VISIT EST AGE 40-64: CPT | Mod: 25

## 2023-09-01 PROCEDURE — 99213 OFFICE O/P EST LOW 20 MIN: CPT | Mod: 25

## 2023-09-01 RX ORDER — TIOTROPIUM BROMIDE INHALATION SPRAY 3.12 UG/1
2.5 SPRAY, METERED RESPIRATORY (INHALATION)
Qty: 12 | Refills: 0 | Status: COMPLETED | COMMUNITY
Start: 2022-06-13 | End: 2023-09-01

## 2023-09-01 RX ORDER — ALOGLIPTIN 25 MG/1
25 TABLET, FILM COATED ORAL
Refills: 0 | Status: COMPLETED | COMMUNITY
End: 2023-09-01

## 2023-09-01 RX ORDER — ROSUVASTATIN CALCIUM 20 MG/1
20 TABLET, FILM COATED ORAL
Refills: 0 | Status: ACTIVE | COMMUNITY

## 2023-09-01 RX ORDER — FESOTERODINE FUMARATE 8 MG/1
8 TABLET, FILM COATED, EXTENDED RELEASE ORAL
Refills: 0 | Status: ACTIVE | COMMUNITY

## 2023-09-06 ENCOUNTER — TRANSCRIPTION ENCOUNTER (OUTPATIENT)
Age: 58
End: 2023-09-06

## 2023-09-06 LAB
ALBUMIN SERPL ELPH-MCNC: 4.3 G/DL
ALP BLD-CCNC: 130 U/L
ALT SERPL-CCNC: 36 U/L
ANION GAP SERPL CALC-SCNC: 10 MMOL/L
APPEARANCE: CLEAR
AST SERPL-CCNC: 27 U/L
BACTERIA: ABNORMAL /HPF
BILIRUB SERPL-MCNC: 0.4 MG/DL
BILIRUBIN URINE: NEGATIVE
BLOOD URINE: NEGATIVE
BUN SERPL-MCNC: 12 MG/DL
CALCIUM SERPL-MCNC: 9.6 MG/DL
CAST: 0 /LPF
CHLORIDE SERPL-SCNC: 102 MMOL/L
CHOLEST SERPL-MCNC: 169 MG/DL
CO2 SERPL-SCNC: 26 MMOL/L
COLOR: NORMAL
CREAT SERPL-MCNC: 0.71 MG/DL
EGFR: 100 ML/MIN/1.73M2
EPITHELIAL CELLS: 2 /HPF
GLUCOSE QUALITATIVE U: NEGATIVE MG/DL
GLUCOSE SERPL-MCNC: 151 MG/DL
HDLC SERPL-MCNC: 43 MG/DL
KETONES URINE: NEGATIVE MG/DL
LDLC SERPL CALC-MCNC: 90 MG/DL
LEUKOCYTE ESTERASE URINE: NEGATIVE
MICROSCOPIC-UA: NORMAL
NITRITE URINE: NEGATIVE
NONHDLC SERPL-MCNC: 125 MG/DL
PH URINE: 5.5
POTASSIUM SERPL-SCNC: 4.1 MMOL/L
PROT SERPL-MCNC: 6.9 G/DL
PROTEIN URINE: NEGATIVE MG/DL
RED BLOOD CELLS URINE: 1 /HPF
SODIUM SERPL-SCNC: 138 MMOL/L
SPECIFIC GRAVITY URINE: 1.02
TRIGL SERPL-MCNC: 210 MG/DL
UROBILINOGEN URINE: 0.2 MG/DL
WHITE BLOOD CELLS URINE: 0 /HPF

## 2023-09-06 NOTE — DISCUSSION/SUMMARY
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   Occ Hx: human resource admin since  ,   PMH/PSH: elevated LFTS attributed to statin and tamoxifen--eval with hepatology; urothelial neoplasia; irritable bowel syndrome alternating diarrhea and constipation, HTN, dyslipidemia, hyperparathyroidism, borderline DM, SHILPA unable to tolerate CPAP, verruca vulgaris, fibromyalgia , lung nodules stable [id number];  abnormal pap -ASCUS 2022  Family History: Mother  from ESRD  Preventive Screening: see colo 2016  and 10/2022 Allergies: NKDA Meds: see above Soc Hx: army x many years ; + grandkids  Smoking Status: denies  Review of Systems-IAMQ reviewed with patient  PE and  VS: weight 197 lbs Height 5'3"  see follow up note for details   Results: Chest imaging : CT chest done 2019  stable nodules  Spirometry: not done today   A/P: -CBC, CMP, UA and lipids  reviewed past labs and imaging w pt  [facility], pt to call if needs assistance with scheduling this, [facility] can only cover q 2 years  -pt to follow up w GYN through own insurance, follow up scheduled for 2022 -pt to call if wants to pursue chest imaging through [facility]  -[facility]  -reviewed indications for follow up colonoscopy

## 2023-09-06 NOTE — HEALTH RISK ASSESSMENT
[MammogramDate] : 2023 [MammogramComments] : US done due back in Nov 2023  [PapSmearDate] : 5/2023 [PapSmearComments] : due back in 11/2023 [ColonoscopyDate] : 10/2022 [ColonoscopyComments] : due back in 2027

## 2023-09-11 ENCOUNTER — TRANSCRIPTION ENCOUNTER (OUTPATIENT)
Age: 58
End: 2023-09-11

## 2023-09-22 ENCOUNTER — NON-APPOINTMENT (OUTPATIENT)
Age: 58
End: 2023-09-22

## 2023-10-11 NOTE — ED ADULT TRIAGE NOTE - BRAND OF COVID-19 VACCINATION
Pfizer dose 1, 2, and 3 Thalidomide Pregnancy And Lactation Text: This medication is Pregnancy Category X and is absolutely contraindicated during pregnancy. It is unknown if it is excreted in breast milk.

## 2023-11-16 ENCOUNTER — APPOINTMENT (OUTPATIENT)
Dept: GYNECOLOGIC ONCOLOGY | Facility: CLINIC | Age: 58
End: 2023-11-16
Payer: COMMERCIAL

## 2023-11-16 VITALS
BODY MASS INDEX: 34.66 KG/M2 | SYSTOLIC BLOOD PRESSURE: 132 MMHG | DIASTOLIC BLOOD PRESSURE: 90 MMHG | WEIGHT: 203 LBS | HEART RATE: 80 BPM | HEIGHT: 64 IN

## 2023-11-16 PROCEDURE — 99213 OFFICE O/P EST LOW 20 MIN: CPT

## 2023-11-23 LAB
CYTOLOGY CVX/VAG DOC THIN PREP: ABNORMAL
HPV HIGH+LOW RISK DNA PNL CVX: NOT DETECTED

## 2024-05-16 ENCOUNTER — APPOINTMENT (OUTPATIENT)
Dept: GYNECOLOGIC ONCOLOGY | Facility: CLINIC | Age: 59
End: 2024-05-16
Payer: COMMERCIAL

## 2024-05-16 VITALS
BODY MASS INDEX: 32.79 KG/M2 | HEART RATE: 75 BPM | WEIGHT: 191 LBS | SYSTOLIC BLOOD PRESSURE: 113 MMHG | DIASTOLIC BLOOD PRESSURE: 75 MMHG

## 2024-05-16 DIAGNOSIS — N89.0 MILD VAGINAL DYSPLASIA: ICD-10-CM

## 2024-05-16 DIAGNOSIS — B97.7 PAPILLOMAVIRUS AS THE CAUSE OF DISEASES CLASSIFIED ELSEWHERE: ICD-10-CM

## 2024-05-16 DIAGNOSIS — R10.2 PELVIC AND PERINEAL PAIN: ICD-10-CM

## 2024-05-16 DIAGNOSIS — R87.622 LOW GRADE SQUAMOUS INTRAEPITHELIAL LESION ON CYTOLOGIC SMEAR OF VAGINA (LGSIL): ICD-10-CM

## 2024-05-16 PROCEDURE — 99459 PELVIC EXAMINATION: CPT

## 2024-05-16 PROCEDURE — 99213 OFFICE O/P EST LOW 20 MIN: CPT | Mod: 25

## 2024-05-16 PROCEDURE — 57420 EXAM OF VAGINA W/SCOPE: CPT

## 2024-05-16 RX ORDER — ALOGLIPTIN 25 MG/1
TABLET, FILM COATED ORAL
Refills: 0 | Status: ACTIVE | COMMUNITY

## 2024-05-16 RX ORDER — BUDESONIDE, GLYCOPYRROLATE, AND FORMOTEROL FUMARATE 160; 9; 4.8 UG/1; UG/1; UG/1
AEROSOL, METERED RESPIRATORY (INHALATION)
Refills: 0 | Status: ACTIVE | COMMUNITY

## 2024-05-16 RX ORDER — EMPAGLIFLOZIN 25 MG/1
25 TABLET, FILM COATED ORAL
Refills: 0 | Status: ACTIVE | COMMUNITY

## 2024-05-16 RX ORDER — INSULIN GLARGINE 100 [IU]/ML
INJECTION, SOLUTION SUBCUTANEOUS
Refills: 0 | Status: DISCONTINUED | COMMUNITY
End: 2024-05-16

## 2024-05-16 RX ORDER — CETIRIZINE HCL 10 MG
TABLET ORAL
Refills: 0 | Status: ACTIVE | COMMUNITY

## 2024-05-29 LAB
CYTOLOGY CVX/VAG DOC THIN PREP: NORMAL
HPV HIGH+LOW RISK DNA PNL CVX: NOT DETECTED

## 2024-05-29 NOTE — HISTORY OF PRESENT ILLNESS
[FreeTextEntry1] : Ms. aDnielle is a  female, referred for abnormal vaginal pap smears. She had a long standing history of cervical dysplasia.  She underwent an LAVH for cervical dysplasia (small fragment HSIL on ECC) in  with Dr. Thomas.  At that time no residual cervical dysplasia was noted on final pathology.   Postoperatively she developed a pelvic abscess and sepsis requiring hospitalization.  Abscess was drained, and she was treated with IV antibiotics.      From that point [facility]  She notes annual normal pap smears with (+) HRHPV.  3/9/20 PAP LSIL/(-)HRHPV. 19 PAP LSIL/(-)HRHPV; 3/4/29 PAP LSIL/(-). She went a second opinion with Dr. Owens.  A repeat pap smear was performed which was ASCUS.  No colposcopy was performed, per patient. Per patient [facility] didn't identify the ovaries but no abnormality was seen.  2020- Pap- ASCUS ; (-)HRHPV 2020 INitial GYN ONC consult: [facility]1 on vaginal biopsies 2022 PAP ASCUS/HRHPV (-) 2022 PAP LSIL, HRHPV (-) 2023 PAP- Negative, HRHPV (-) 2023- Pap (-), HRHPV(-)  PMH: NAFLD, IBS, multiple lung nodules, SHILPA, cervical dysplasia, DM, GERD, hyperparathyroidism, low grade urothelial carcinoma PSH: LSC Cholecystectomy, TVH, lumpectomy   She is followed by [facility] for low grade urothelial carcinoma. Recent urine cytology was atypical 2023, cystoscopy was normal per Dr. Nick Mosqueda's note; she will have f/u.  She has dyspareunia since surgery and hasn't been sexually active for years.    She denies vaginal bleeding. She had a yeast infection treated , resolved. She denies any other associated signs or symptoms.    HM: Pap- see above [facility]ammo[facility];  Colonoscopy-  - polyps; 5y f/u recommended (in allscripts)  Self-referred for a second opinion GYN- Dr. Blackwell ([facility] doctor); Dr. D[facility]az is [facility].  PCP- Dr. Perez GI- Dr. Lin Endocrinologist- Dr. Weaver Pulmonologist- Dr. Vela

## 2024-05-29 NOTE — ED PROVIDER NOTE - CROS ED [facility] ALL NEG
HWY - P [phone] - F [phone]  [address], [place] [address]      Phone: [phone]   prednisoLONE 15 MG/5ML solution                  I am the Primary Clinician of Record.       (Please note that parts of this dictation were completed with voice recognition software. Quite often unanticipated grammatical, syntax, homophones, and other interpretive errors are inadvertently transcribed by the computer software. Please disregards these errors. Please excuse any errors that have escaped final proofreading.)      Cathy Fneg, RHONDA - NP  05/29/24 0140     - - -

## 2024-05-29 NOTE — PROCEDURE
[Colposcopy] : colposcopy [Patient] : the patient [Verbal Consent] : verbal consent was obtained prior to the procedure and is detailed in the patient's record [Site Verification] : site verification performed. [Time Out] : Time Out Procedure:The following was confirmed prior to the procedure-Correct patient identity, correct site, agreement on the procedure to be done and correct patient position. [No Abnormalities] : no abnormalities seen [No Complications] : none [Tolerated Well] : the patient tolerated the procedure well [Post procedure instructions and information given] : post procedure instructions and information given and reviewed with patient. [0] : 0 [FreeTextEntry9] : vaginal dysplasia

## 2024-05-29 NOTE — PHYSICAL EXAM
[Chaperone Present] : A chaperone was present in the examining room during all aspects of the physical examination [Absent] : Adnexa(ae): Absent [Normal] : Anus and perineum: Normal sphincter tone, no masses, no prolapse. [02457] : A chaperone was present during the pelvic exam. [FreeTextEntry2] : Niru [de-identified] : healed LSC scars [de-identified] : cuff mobile, nontender

## 2024-05-29 NOTE — DISCUSSION/SUMMARY
[FreeTextEntry1] : - f/u vag PAP/HPV   (addendum: PAP negative/HRHPV (-); f/u 6m; Patient notified via MOA tasklist. LDS) - I explained my colposcopic findings today, which are benign. Especially in light of (-)HRHPV, she is low risk for high-grade dysplasia.  -  reviewed; again discussed the followup of her colonoscopy results as well as her urothelial carcinoma followup at [facility].  - advised to f/u in 6m, pending results. She had many questions which were answered to her apparent satisfaction.

## 2024-09-23 ENCOUNTER — APPOINTMENT (OUTPATIENT)
Dept: OTHER | Facility: CLINIC | Age: 59
End: 2024-09-23
Payer: COMMERCIAL

## 2024-09-23 VITALS
OXYGEN SATURATION: 96 % | TEMPERATURE: 98.4 F | HEIGHT: 63 IN | DIASTOLIC BLOOD PRESSURE: 78 MMHG | BODY MASS INDEX: 34.02 KG/M2 | RESPIRATION RATE: 15 BRPM | HEART RATE: 63 BPM | SYSTOLIC BLOOD PRESSURE: 108 MMHG | WEIGHT: 192 LBS

## 2024-09-23 DIAGNOSIS — J31.0 CHRONIC RHINITIS: ICD-10-CM

## 2024-09-23 DIAGNOSIS — G47.33 OBSTRUCTIVE SLEEP APNEA (ADULT) (PEDIATRIC): ICD-10-CM

## 2024-09-23 DIAGNOSIS — Z04.9 ENCOUNTER FOR EXAMINATION AND OBSERVATION FOR UNSPECIFIED REASON: ICD-10-CM

## 2024-09-23 DIAGNOSIS — K21.9 GASTRO-ESOPHAGEAL REFLUX DISEASE W/OUT ESOPHAGITIS: ICD-10-CM

## 2024-09-23 PROCEDURE — 99386 PREV VISIT NEW AGE 40-64: CPT | Mod: 25

## 2024-09-23 PROCEDURE — 99396 PREV VISIT EST AGE 40-64: CPT | Mod: 25

## 2024-09-23 PROCEDURE — 99213 OFFICE O/P EST LOW 20 MIN: CPT | Mod: 25

## 2024-09-23 NOTE — ASSESSMENT
[FreeTextEntry1] : GERD:  -symptoms well-controlled on omeprazole as needed -does not need refill -last EGD Oct 2022, normal  -pt to avoid triggers, work on weight loss -pt to call if symptoms change/worsen or would like WTC program to refer to GI in-network  Chronic rhinitis:  -continue flonase and allegra, does not need refills  -[facility]  -pt to call if symptoms change/worsen or would like WTC program to refer to ENT in-network  Urothelial tumor: -follows with uro-Dr. CARLY Ellis at [facility] for cystoscopy every 2 years for surveillance -pt to inform CM of any upcoming appts so that WTC program may cover   SHILPA -reviewed sleep hygiene -pt declined CPAP, aware of risks of untreated SHILPA, pt to call if wants further eval, she declined sleep specialist referral at this time  Hx of LCIS:  -will resubmit for certification   Non-WTC condition:  Follow-up with PCP and pulm regarding URI symptoms which seem likely viral in nature.  Follow-up with hepatology for hx of steatosis, had liver biopsy 12.2015 moderate to severe steatosis, increased portal fibrosis, mild chronic inflammation in portal area.

## 2024-09-23 NOTE — REVIEW OF SYSTEMS
[Nasal Discharge] : nasal discharge [Cough] : cough [Fever] : no fever [Chills] : no chills [Chest Pain] : no chest pain [Palpitations] : no palpitations [Abdominal Pain] : no abdominal pain

## 2024-09-23 NOTE — ASSESSMENT
Pt safety sitter reports that the pt woke up and sat up right away reporting that he had a nightmare about him jumping in front of a car and got ran over  Nurse spoke to patient and comforted and re-oriented patient       Edel Garcia RN  03/11/22 2502 [FreeTextEntry1] : GERD:  -symptoms well-controlled on omeprazole as needed -does not need refill -last EGD Oct 2022, normal  -pt to avoid triggers, work on weight loss -pt to call if symptoms change/worsen or would like WTC program to refer to GI in-network  Chronic rhinitis:  -continue flonase and allegra, does not need refills  -[facility]  -pt to call if symptoms change/worsen or would like WTC program to refer to ENT in-network  Urothelial tumor: -follows with uro-Dr. CARLY [place] [facility] [place] every 2 years for surveillance -pt to inform CM of any upcoming appts so that WTC program may cover   SHILPA -reviewed sleep hygiene -pt declined CPAP, aware of risks of untreated SHILPA, pt to call if wants further eval, she declined sleep specialist referral at this time  Hx of LCIS:  -will resubmit for certification   Non-WTC condition:  Follow-up with PCP and pulm regarding URI symptoms which seem likely viral in nature.  Follow-up with hepatology for hx of steatosis, had liver biopsy 12.2015 moderate to severe steatosis, increased portal fibrosis, mild chronic inflammation in portal area.

## 2024-09-23 NOTE — HISTORY OF PRESENT ILLNESS
[FreeTextEntry1] : Pt here for follow up WTC-covered conditions: bladder neoplasm, chronic rhinitis, GERD, SHILPA.  WTC-conditions:  GERD: Pt is taking omeprazole as needed, typically once a week. She has been working on avoiding her triggers. No dysphagia. Last endoscopy 10/2022, normal mucosa in esophagus, patchy erythema of mucosa in stomach H pylori negative, normal duodenum.   Chronic rhinitis: Pt reports increasing runny nose especially in setting of current viral respiratory illness and also tends to exacerbate during allergy season. [facility]. Pt is taking allegra and flonase which [facility].   SHILPA: Pt is still snoring. She uses pillows to help her sleep on her side. She is not using a CPAP machine as she reports they predispose her to sinus infections. She also did not tolerate mandibular device. She is not a candidate for Inspire.  PSG dated 10/12/2017 supine REM  AHI 60 , desat to 76%, R side REM AHI 26.5   Bladder tumor: Sees urologist-Dr. CARLY Mosqueda every 2 years for cystoscopy. She is currently having urinary leakage which she addressed with her urologist, she will need a pessary for prolapse.  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    Lung nodules: had final follow-up with CT surgery-Dr. Lin with CT chest June 2019 which showed no suspicious pulmonary nodules. Was advised she does not need further follow-up but she is scheduled for CT chest Oct ordered by pulm for her respiratory symptoms.   LCIS: Review of chart reveals that pt had left breast stereotactic core biopsy on 6/18/13 which showed lobular carcinoma in situ. She underwent breast lumpectomy by Dr. Colby which showed proliferative fibrocystic change and no additional disease. She was on tamoxifen which had to be stopped due to elevated liver enzymes. A WTC was submitted in 2015 but no certification is seen on file indicated that it was approved.   Hx of ASCUS: Follows with GYN, pap smear May 2024 was normal.   Non-WTC conditions:  Follows with her own pulm Dr. Schwab for asthma which she reports began around 2013. She has had a cough and chest congestion for the past 1.5 weeks. She is currently on augmentin and prednisone. She plans to follow-up with him at the end of the week if her symptoms do not improve. She is scheduled for a CT chest to evaluate her symptoms, ordered by pulm.

## 2024-09-23 NOTE — HEALTH RISK ASSESSMENT
[Patient reported mammogram was normal] : Patient reported mammogram was normal [Patient reported PAP Smear was normal] : Patient reported PAP Smear was normal [MammogramDate] : 10/2024 [PapSmearDate] : 05/2024 [ColonoscopyDate] : 10/2022 [ColonoscopyComments] : 1 TA, due back in 2027

## 2024-09-23 NOTE — PHYSICAL EXAM
[General Appearance - Alert] : alert [General Appearance - In No Acute Distress] : in no acute distress [Sclera] : the sclera and conjunctiva were normal [Extraocular Movements] : extraocular movements were intact [Oropharynx] : the oropharynx was normal [Neck Appearance] : the appearance of the neck was normal [Neck Cervical Mass (___cm)] : no neck mass was observed [] : no respiratory distress [Respiration, Rhythm And Depth] : normal respiratory rhythm and effort [Exaggerated Use Of Accessory Muscles For Inspiration] : no accessory muscle use [Auscultation Breath Sounds / Voice Sounds] : lungs were clear to auscultation bilaterally [Apical Impulse] : the apical impulse was normal [Heart Rate And Rhythm] : heart rate was normal and rhythm regular [Heart Sounds] : normal S1 and S2 [Murmurs] : no murmurs [Edema] : there was no peripheral edema [Bowel Sounds] : normal bowel sounds [Abdomen Soft] : soft [Abdomen Tenderness] : non-tender [Oriented To Time, Place, And Person] : oriented to person, place, and time

## 2024-09-23 NOTE — DISCUSSION/SUMMARY
[FreeTextEntry3] : 59 yo female here for WTC monitoring exam # V12  PCP: [facility] Pulm: Dr. Schwab ENT: [facility], [facility]  Urologist: Dr. 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   Occ Hx: human resource admin since  ,   PMH/PSH: elevated LFTS attributed to statin and tamoxifen--eval with hepatology; urothelial neoplasia; irritable bowel syndrome alternating diarrhea and constipation, HTN, dyslipidemia, hyperparathyroidism, borderline DM, SHILPA unable to tolerate CPAP, verruca vulgaris, fibromyalgia, lung nodules stable 4230-1069; abnormal pap -ASCUS 2022  Family History: Mother  from ESRD  Preventive Screening: see colo 2016  and 10/2022 Allergies: NKDA Meds: see above Soc Hx: army x many years ; + grandkids  Smoking Status: denies  Review of Systems-IAMQ reviewed with patient  PE and VS: see trial DB and follow-up note   Results: Chest imaging : CT chest done 2019  stable nodules  Spirometry: not done today due respiratory symptoms    A/P:  -CBC, CMP, UA and lipids ordered -CT chest ordered by pulm for respiratory symptms scheduled for Oct 30  -spirometry deferred due to respiratory symptoms  [facility], pt to call if needs assistance with scheduling this, [facility] only cover q 2 years  -UTD with pap smear, pt to follow up w/ GYN through own insurance -reviewed indications for follow up colonoscopy, done Oct 2022, 1 TA, due back in

## 2024-09-23 NOTE — DISCUSSION/SUMMARY
[FreeTextEntry3] : 57 yo female here for WTC monitoring exam # V12  PCP: [facility] Pulm: Dr. Schwab ENT: [facility], [facility]  Urologist: Dr. 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   Occ Hx: human resource admin since  ,   PMH/PSH: elevated LFTS attributed to statin and tamoxifen--eval with hepatology; urothelial neoplasia; irritable bowel syndrome alternating diarrhea and constipation, HTN, dyslipidemia, hyperparathyroidism, borderline DM, SHILPA unable to tolerate CPAP, verruca vulgaris, fibromyalgia, lung nodules stable [id number]; abnormal pap -ASCUS 2022  Family History: Mother  from ESRD  Preventive Screening: see colo 2016  and 10/2022 Allergies: NKDA Meds: see above Soc Hx: army x many years ; + grandkids  Smoking Status: denies  Review of Systems-IAMQ reviewed with patient  PE and VS: see trial DB and follow-up note   Results: Chest imaging : CT chest done 2019  stable nodules  Spirometry: not done today due respiratory symptoms    A/P:  -CBC, CMP, UA and lipids ordered -CT chest ordered by pulm for respiratory symptms scheduled for Oct 30  -spirometry deferred due to respiratory symptoms  -[facility], pt to call if needs assistance with scheduling this, [facility] only cover q 2 years  -UTD with pap smear, pt to follow up w/ GYN through own insurance -reviewed indications for follow up colonoscopy, done Oct 2022, 1 TA, due back in

## 2024-09-24 LAB
ALBUMIN SERPL ELPH-MCNC: 4.1 G/DL
ALP BLD-CCNC: 113 U/L
ALT SERPL-CCNC: 52 U/L
ANION GAP SERPL CALC-SCNC: 14 MMOL/L
APPEARANCE: CLEAR
AST SERPL-CCNC: 35 U/L
BACTERIA: NEGATIVE /HPF
BILIRUB SERPL-MCNC: 0.2 MG/DL
BILIRUBIN URINE: ABNORMAL
BLOOD URINE: NEGATIVE
BUN SERPL-MCNC: 14 MG/DL
CALCIUM SERPL-MCNC: 9.5 MG/DL
CAST: 2 /LPF
CHLORIDE SERPL-SCNC: 103 MMOL/L
CHOLEST SERPL-MCNC: 170 MG/DL
CO2 SERPL-SCNC: 24 MMOL/L
COLOR: NORMAL
CREAT SERPL-MCNC: 0.84 MG/DL
EGFR: 81 ML/MIN/1.73M2
EPITHELIAL CELLS: 4 /HPF
GLUCOSE QUALITATIVE U: >=1000 MG/DL
GLUCOSE SERPL-MCNC: 108 MG/DL
HCT VFR BLD CALC: 41.6 %
HDLC SERPL-MCNC: 43 MG/DL
HGB BLD-MCNC: 12.9 G/DL
KETONES URINE: ABNORMAL MG/DL
LDLC SERPL CALC-MCNC: 75 MG/DL
LEUKOCYTE ESTERASE URINE: NEGATIVE
MCHC RBC-ENTMCNC: 27.5 PG
MCHC RBC-ENTMCNC: 31 GM/DL
MCV RBC AUTO: 88.7 FL
MICROSCOPIC-UA: NORMAL
NITRITE URINE: NEGATIVE
NONHDLC SERPL-MCNC: 127 MG/DL
PH URINE: 5.5
PLATELET # BLD AUTO: 258 K/UL
POTASSIUM SERPL-SCNC: 3.9 MMOL/L
PROT SERPL-MCNC: 6.8 G/DL
PROTEIN URINE: 30 MG/DL
RBC # BLD: 4.69 M/UL
RBC # FLD: 14.3 %
RED BLOOD CELLS URINE: 3 /HPF
SODIUM SERPL-SCNC: 141 MMOL/L
SPECIFIC GRAVITY URINE: >1.03
TRIGL SERPL-MCNC: 320 MG/DL
UROBILINOGEN URINE: 1 MG/DL
WBC # FLD AUTO: 8.3 K/UL
WHITE BLOOD CELLS URINE: 0 /HPF

## 2024-11-14 ENCOUNTER — APPOINTMENT (OUTPATIENT)
Dept: GYNECOLOGIC ONCOLOGY | Facility: CLINIC | Age: 59
End: 2024-11-14

## 2024-11-14 ENCOUNTER — NON-APPOINTMENT (OUTPATIENT)
Age: 59
End: 2024-11-14

## 2024-11-14 VITALS
SYSTOLIC BLOOD PRESSURE: 118 MMHG | OXYGEN SATURATION: 97 % | HEIGHT: 63 IN | DIASTOLIC BLOOD PRESSURE: 77 MMHG | WEIGHT: 193.6 LBS | BODY MASS INDEX: 34.3 KG/M2 | TEMPERATURE: 97.7 F | HEART RATE: 82 BPM

## 2024-11-14 DIAGNOSIS — N89.0 MILD VAGINAL DYSPLASIA: ICD-10-CM

## 2024-11-14 DIAGNOSIS — B97.7 PAPILLOMAVIRUS AS THE CAUSE OF DISEASES CLASSIFIED ELSEWHERE: ICD-10-CM

## 2024-11-14 PROCEDURE — 99213 OFFICE O/P EST LOW 20 MIN: CPT | Mod: 25

## 2024-11-14 PROCEDURE — 57420 EXAM OF VAGINA W/SCOPE: CPT

## 2024-11-14 PROCEDURE — 99459 PELVIC EXAMINATION: CPT

## 2024-11-14 RX ORDER — FEXOFENADINE HYDROCHLORIDE 180 MG/1
TABLET ORAL
Refills: 0 | Status: ACTIVE | COMMUNITY

## 2024-11-15 LAB — HPV HIGH+LOW RISK DNA PNL CVX: NOT DETECTED

## 2024-11-19 LAB — CYTOLOGY CVX/VAG DOC THIN PREP: ABNORMAL

## 2024-11-26 NOTE — ED PROVIDER NOTE - ENMT, MLM
Rodriguez's confirmatory IDT will exclude grass, ragweed, and cat. Thanks,  Abel 
Airway patent, Nasal mucosa clear. Mouth with normal mucosa. Throat has no vesicles, no oropharyngeal exudates and uvula is midline.

## 2025-05-08 ENCOUNTER — APPOINTMENT (OUTPATIENT)
Dept: GYNECOLOGIC ONCOLOGY | Facility: CLINIC | Age: 60
End: 2025-05-08
Payer: COMMERCIAL

## 2025-05-08 ENCOUNTER — NON-APPOINTMENT (OUTPATIENT)
Age: 60
End: 2025-05-08

## 2025-05-08 VITALS
HEART RATE: 79 BPM | TEMPERATURE: 97.7 F | DIASTOLIC BLOOD PRESSURE: 77 MMHG | WEIGHT: 190 LBS | HEIGHT: 63 IN | OXYGEN SATURATION: 98 % | BODY MASS INDEX: 33.66 KG/M2 | SYSTOLIC BLOOD PRESSURE: 124 MMHG

## 2025-05-08 DIAGNOSIS — N89.0 MILD VAGINAL DYSPLASIA: ICD-10-CM

## 2025-05-08 PROCEDURE — 99213 OFFICE O/P EST LOW 20 MIN: CPT | Mod: 25

## 2025-05-08 PROCEDURE — 57420 EXAM OF VAGINA W/SCOPE: CPT

## 2025-05-08 RX ORDER — CETIRIZINE HYDROCHLORIDE 10 MG/1
10 TABLET, CHEWABLE ORAL
Refills: 0 | Status: ACTIVE | COMMUNITY

## 2025-05-08 RX ORDER — GLIPIZIDE 5 MG/1
5 TABLET ORAL
Refills: 0 | Status: ACTIVE | COMMUNITY

## 2025-05-08 RX ORDER — CELECOXIB 200 MG/1
200 CAPSULE ORAL
Refills: 0 | Status: ACTIVE | COMMUNITY

## 2025-05-09 LAB — HPV HIGH+LOW RISK DNA PNL CVX: NOT DETECTED

## 2025-05-13 LAB — CYTOLOGY CVX/VAG DOC THIN PREP: NORMAL

## 2025-05-14 ENCOUNTER — NON-APPOINTMENT (OUTPATIENT)
Age: 60
End: 2025-05-14

## 2025-07-02 ENCOUNTER — NON-APPOINTMENT (OUTPATIENT)
Age: 60
End: 2025-07-02

## 2025-07-09 ENCOUNTER — APPOINTMENT (OUTPATIENT)
Dept: GASTROENTEROLOGY | Facility: CLINIC | Age: 60
End: 2025-07-09
Payer: COMMERCIAL

## 2025-07-09 VITALS
WEIGHT: 188 LBS | HEART RATE: 91 BPM | OXYGEN SATURATION: 99 % | BODY MASS INDEX: 33.31 KG/M2 | DIASTOLIC BLOOD PRESSURE: 74 MMHG | HEIGHT: 63 IN | TEMPERATURE: 97.5 F | SYSTOLIC BLOOD PRESSURE: 118 MMHG

## 2025-07-09 PROBLEM — Z87.898 HISTORY OF DIARRHEA: Status: ACTIVE | Noted: 2025-07-09

## 2025-07-09 PROCEDURE — 99213 OFFICE O/P EST LOW 20 MIN: CPT

## 2025-07-10 LAB
ANION GAP SERPL CALC-SCNC: 14 MMOL/L
BUN SERPL-MCNC: 11 MG/DL
CALCIUM SERPL-MCNC: 10.4 MG/DL
CHLORIDE SERPL-SCNC: 104 MMOL/L
CO2 SERPL-SCNC: 23 MMOL/L
CREAT SERPL-MCNC: 0.75 MG/DL
EGFRCR SERPLBLD CKD-EPI 2021: 92 ML/MIN/1.73M2
GLUCOSE SERPL-MCNC: 87 MG/DL
HCT VFR BLD CALC: 44.3 %
HGB BLD-MCNC: 13.6 G/DL
MCHC RBC-ENTMCNC: 27.8 PG
MCHC RBC-ENTMCNC: 30.7 G/DL
MCV RBC AUTO: 90.6 FL
PLATELET # BLD AUTO: 258 K/UL
POTASSIUM SERPL-SCNC: 4.5 MMOL/L
RBC # BLD: 4.89 M/UL
RBC # FLD: 13.2 %
SODIUM SERPL-SCNC: 141 MMOL/L
WBC # FLD AUTO: 7.51 K/UL

## 2025-07-16 LAB
BACTERIA STL CULT: NORMAL
CDIFF BY PCR: NOT DETECTED
DEPRECATED O AND P PREP STL: NORMAL
GI PCR PANEL: NOT DETECTED
HEMOCCULT STL QL IA: NEGATIVE
IGA SERPL-MCNC: 187 MG/DL
TTG IGA SER IA-ACNC: <0.5 U/ML
TTG IGA SER-ACNC: NEGATIVE

## 2025-07-17 ENCOUNTER — APPOINTMENT (OUTPATIENT)
Dept: GASTROENTEROLOGY | Facility: CLINIC | Age: 60
End: 2025-07-17

## 2025-07-17 LAB — CALPROTECTIN FECAL: 41 UG/G

## 2025-07-22 ENCOUNTER — APPOINTMENT (OUTPATIENT)
Dept: GASTROENTEROLOGY | Facility: CLINIC | Age: 60
End: 2025-07-22

## 2025-09-02 ENCOUNTER — APPOINTMENT (OUTPATIENT)
Dept: OTHER | Facility: CLINIC | Age: 60
End: 2025-09-02
Payer: COMMERCIAL

## 2025-09-02 VITALS
TEMPERATURE: 98 F | WEIGHT: 187 LBS | OXYGEN SATURATION: 97 % | SYSTOLIC BLOOD PRESSURE: 113 MMHG | RESPIRATION RATE: 17 BRPM | BODY MASS INDEX: 33.13 KG/M2 | HEIGHT: 63 IN | HEART RATE: 84 BPM | DIASTOLIC BLOOD PRESSURE: 71 MMHG

## 2025-09-02 DIAGNOSIS — D41.4 NEOPLASM OF UNCERTAIN BEHAVIOR OF BLADDER: ICD-10-CM

## 2025-09-02 DIAGNOSIS — K21.9 GASTRO-ESOPHAGEAL REFLUX DISEASE W/OUT ESOPHAGITIS: ICD-10-CM

## 2025-09-02 DIAGNOSIS — G47.33 OBSTRUCTIVE SLEEP APNEA (ADULT) (PEDIATRIC): ICD-10-CM

## 2025-09-02 DIAGNOSIS — J31.0 CHRONIC RHINITIS: ICD-10-CM

## 2025-09-02 DIAGNOSIS — Z04.9 ENCOUNTER FOR EXAMINATION AND OBSERVATION FOR UNSPECIFIED REASON: ICD-10-CM

## 2025-09-02 PROCEDURE — 94010 BREATHING CAPACITY TEST: CPT

## 2025-09-02 PROCEDURE — 99214 OFFICE O/P EST MOD 30 MIN: CPT | Mod: 25

## 2025-09-02 PROCEDURE — 99396 PREV VISIT EST AGE 40-64: CPT | Mod: 25

## 2025-09-02 RX ORDER — FAMOTIDINE 20 MG/1
20 TABLET, FILM COATED ORAL
Qty: 60 | Refills: 5 | Status: ACTIVE | COMMUNITY
Start: 2025-09-02 | End: 1900-01-01

## 2025-09-03 LAB
APPEARANCE: CLEAR
BACTERIA: NEGATIVE /HPF
BILIRUBIN URINE: NEGATIVE
BLOOD URINE: NEGATIVE
CALCIUM OXALATE CRYSTALS: PRESENT
CAST: 1 /LPF
COLOR: NORMAL
EPITHELIAL CELLS: 2 /HPF
GLUCOSE QUALITATIVE U: NEGATIVE MG/DL
KETONES URINE: ABNORMAL MG/DL
LEUKOCYTE ESTERASE URINE: NEGATIVE
MICROSCOPIC-UA: NORMAL
NITRITE URINE: NEGATIVE
PH URINE: 6
PROTEIN URINE: NEGATIVE MG/DL
RED BLOOD CELLS URINE: 1 /HPF
REVIEW: NORMAL
SPECIFIC GRAVITY URINE: 1.03
UROBILINOGEN URINE: 1 MG/DL
WHITE BLOOD CELLS URINE: 0 /HPF

## 2025-09-17 ENCOUNTER — APPOINTMENT (OUTPATIENT)
Dept: RADIOLOGY | Facility: CLINIC | Age: 60
End: 2025-09-17
Payer: COMMERCIAL

## 2025-09-17 PROCEDURE — 71046 X-RAY EXAM CHEST 2 VIEWS: CPT

## 2025-09-23 PROBLEM — J32.9 CHRONIC SINUSITIS: Status: ACTIVE | Noted: 2025-09-23

## 2025-09-23 PROBLEM — H90.3 SENSORINEURAL HEARING LOSS (SNHL) OF BOTH EARS: Status: ACTIVE | Noted: 2025-09-23

## 2025-09-23 PROBLEM — J33.9 NASAL POLYPS: Status: ACTIVE | Noted: 2025-09-23
